# Patient Record
Sex: FEMALE | Race: WHITE | Employment: OTHER | URBAN - METROPOLITAN AREA
[De-identification: names, ages, dates, MRNs, and addresses within clinical notes are randomized per-mention and may not be internally consistent; named-entity substitution may affect disease eponyms.]

---

## 2018-10-19 ENCOUNTER — TELEPHONE (OUTPATIENT)
Dept: OBGYN CLINIC | Facility: HOSPITAL | Age: 58
End: 2018-10-19

## 2018-10-19 NOTE — TELEPHONE ENCOUNTER
Patient is calling and is wanting to become a new patient  She has regional pain syndrome and has been seeing a doctor who was controlling her medication  Her doctor then left the practice and she hasn't been seen since  She will have the records transferred over  She was supplied the [facility] fax # [phone]

## 2018-11-13 NOTE — TELEPHONE ENCOUNTER
Records received in [facility] office  Left message for patient to call back  We need to verify what insurance patient has  Also has patient had any imaging studies done? Please obtain this information from patient  Thank you!

## 2018-11-13 NOTE — TELEPHONE ENCOUNTER
Patient is calling back stating that she has Medicare  Also, she is pending to start a primary of [employer]  This would start in January  She states that she had a CT done of her abdomen and pelvis

## 2018-11-15 NOTE — TELEPHONE ENCOUNTER
Left message for patient regarding records  In order to move forward Dr Soriano July has requested records from [place]  to be faxed for further review  I also asked Christian Stern if she could fax us a [place] PDMP for review as well if she received pain medications from that dr as well

## 2018-12-28 NOTE — TELEPHONE ENCOUNTER
Pt's  calling for an update  Asking if the records were received yet from Dr Swetha Jennings office in [place], [place]  If not, he will have to get in contact with them again  Please call Slim Rene at [phone]

## 2018-12-28 NOTE — TELEPHONE ENCOUNTER
Called Gary back and informed him as below in previous message that we have not received records from [place]  They will need to request them to be faxed to us   Provided  as call back if further questions

## 2019-01-03 NOTE — TELEPHONE ENCOUNTER
Rest of records were received, back for Dr Galindo Conway to review, lmom for patient stating this as well

## 2019-01-16 NOTE — TELEPHONE ENCOUNTER
Pt  AVIVA  called to check the status of Dr Sunday Arellano evaluation of pt file  Please advise   Pt can be reached at [phone]

## 2019-01-18 NOTE — TELEPHONE ENCOUNTER
Records is still pending for review, on provider desk  Will follow up with patient and  to confirm this information

## 2019-01-24 NOTE — TELEPHONE ENCOUNTER
Patient`s  called checking on the status of record review please advise thanks                              Call back# [phone]

## 2019-01-29 NOTE — TELEPHONE ENCOUNTER
Pt called back to get the status of the records review  Pt is scheduled for 2/28  Pt is aware of the terms Lisset Rouse has came up with regarding her care and she states " she will try anything for a doctor who is willing to go through her [place]" pt expressed gratitude  I advised pt that the first appt is a consultation and Dr Lisset Rouse will speak further on the Interventional options

## 2019-01-29 NOTE — TELEPHONE ENCOUNTER
lmom for patient to call back  Per Dr Armand Richard:     1- does not rx opioid for her conditionn  2- if patient is interested in INTERVENTIONAL options she can schedule a consult

## 2019-02-28 ENCOUNTER — OFFICE VISIT (OUTPATIENT)
Dept: PAIN MEDICINE | Facility: MEDICAL CENTER | Age: 59
End: 2019-02-28
Payer: COMMERCIAL

## 2019-02-28 VITALS
WEIGHT: 228 LBS | DIASTOLIC BLOOD PRESSURE: 106 MMHG | HEART RATE: 60 BPM | SYSTOLIC BLOOD PRESSURE: 186 MMHG | BODY MASS INDEX: 36.64 KG/M2 | HEIGHT: 66 IN

## 2019-02-28 DIAGNOSIS — G89.4 CHRONIC PAIN SYNDROME: Primary | ICD-10-CM

## 2019-02-28 DIAGNOSIS — G90.50 COMPLEX REGIONAL PAIN SYNDROME TYPE 1, AFFECTING UNSPECIFIED SITE: ICD-10-CM

## 2019-02-28 DIAGNOSIS — Z79.891 LONG-TERM CURRENT USE OF OPIATE ANALGESIC: ICD-10-CM

## 2019-02-28 DIAGNOSIS — F12.90 MARIJUANA USE: ICD-10-CM

## 2019-02-28 PROCEDURE — 99204 OFFICE O/P NEW MOD 45 MIN: CPT | Performed by: PHYSICAL MEDICINE & REHABILITATION

## 2019-02-28 RX ORDER — ASPIRIN 81 MG/1
81 TABLET, CHEWABLE ORAL
COMMUNITY
Start: 2017-04-11

## 2019-02-28 RX ORDER — MONTELUKAST SODIUM 10 MG/1
10 TABLET ORAL DAILY
Refills: 0 | COMMUNITY
Start: 2018-12-10

## 2019-02-28 RX ORDER — MORPHINE SULFATE 30 MG/1
30 TABLET ORAL EVERY 4 HOURS PRN
COMMUNITY

## 2019-02-28 RX ORDER — FUROSEMIDE 20 MG/1
20 TABLET ORAL
COMMUNITY
End: 2020-11-11 | Stop reason: ALTCHOICE

## 2019-02-28 RX ORDER — ATORVASTATIN CALCIUM 10 MG/1
10 TABLET, FILM COATED ORAL DAILY
Refills: 0 | COMMUNITY
Start: 2019-01-23

## 2019-02-28 RX ORDER — TIZANIDINE 4 MG/1
TABLET ORAL
COMMUNITY
Start: 2019-02-22

## 2019-02-28 RX ORDER — DOXYCYCLINE HYCLATE 100 MG/1
1 CAPSULE ORAL
COMMUNITY
Start: 2018-03-27 | End: 2020-05-11 | Stop reason: SDUPTHER

## 2019-02-28 RX ORDER — CETIRIZINE HYDROCHLORIDE 10 MG/1
10 TABLET ORAL
COMMUNITY
Start: 2018-02-23

## 2019-02-28 RX ORDER — BUTALBITAL, ACETAMINOPHEN AND CAFFEINE 50; 325; 40 MG/1; MG/1; MG/1
1 TABLET ORAL EVERY 6 HOURS PRN
Refills: 0 | COMMUNITY
Start: 2019-01-07 | End: 2020-11-11 | Stop reason: ALTCHOICE

## 2019-02-28 RX ORDER — ALBUTEROL SULFATE 90 UG/1
2 AEROSOL, METERED RESPIRATORY (INHALATION) 2 TIMES DAILY PRN
COMMUNITY

## 2019-02-28 RX ORDER — PROMETHAZINE HYDROCHLORIDE 25 MG/1
25 TABLET ORAL EVERY 8 HOURS
Refills: 0 | COMMUNITY
Start: 2019-01-24 | End: 2020-11-11 | Stop reason: ALTCHOICE

## 2019-02-28 RX ORDER — HYDROCHLOROTHIAZIDE 12.5 MG/1
12.5 CAPSULE, GELATIN COATED ORAL
COMMUNITY
End: 2020-11-11 | Stop reason: ALTCHOICE

## 2019-02-28 RX ORDER — AMLODIPINE BESYLATE 10 MG/1
1 TABLET ORAL
COMMUNITY
Start: 2018-03-28 | End: 2020-11-11 | Stop reason: DRUGHIGH

## 2019-02-28 RX ORDER — DULOXETIN HYDROCHLORIDE 30 MG/1
30 CAPSULE, DELAYED RELEASE ORAL DAILY
Refills: 0 | COMMUNITY
Start: 2019-01-26 | End: 2020-05-11 | Stop reason: ALTCHOICE

## 2019-02-28 RX ORDER — ALPRAZOLAM 0.25 MG/1
1 TABLET ORAL 3 TIMES DAILY PRN
COMMUNITY
End: 2020-11-11 | Stop reason: ALTCHOICE

## 2019-02-28 RX ORDER — RANITIDINE 150 MG/1
150 CAPSULE ORAL
COMMUNITY
End: 2020-11-11 | Stop reason: ALTCHOICE

## 2019-02-28 NOTE — PROGRESS NOTES
Assessment:  1  Chronic pain syndrome    2  Complex regional pain syndrome type 1, affecting unspecified site    3  Long-term current use of opiate analgesic    4  Marijuana use        Plan:  I reviewed with the patient that unfortunately the complexity of her condition is beyond what we are able to assist with in our clinic  We do not have the ability to utilize ketamine infusions and she likely would not respond to interventional approaches such as spinal cord stimulators  She is currently managed on high dose opioid therapy with medical marijuana as well as Zanaflex and alprazolam   This is a dangerous combination of medications placing her at significant risk for respiratory depression and death  She describes whole body CRPS and I recommend she follow up with 1 of the tertiary care centers to establish a long-term treatment strategy with avoidance of chronic opioid therapy as this typically worsens the hyperalgesic state of CRPS  My impressions and treatment recommendations were discussed in detail with the patient who verbalized understanding and had no further questions  Discharge instructions were provided  I personally saw and examined the patient and I agree with the above discussed plan of care  No orders of the defined types were placed in this encounter      New Medications Ordered This Visit   Medications    albuterol (PROVENTIL HFA) 90 mcg/act inhaler     Sig: Inhale 2 puffs 2 (two) times a day as needed    ALPRAZolam (XANAX) 0 25 mg tablet     Sig: Take 1 mg by mouth Three times daily as needed    amLODIPine (NORVASC) 10 mg tablet     Sig: Take 1 tablet by mouth    aspirin 81 mg chewable tablet     Sig: Chew 81 mg    atorvastatin (LIPITOR) 10 mg tablet     Sig: Take 10 mg by mouth daily     Refill:  0    butalbital-acetaminophen-caffeine (FIORICET,ESGIC) -40 mg per tablet     Sig: Take 1 tablet by mouth every 6 (six) hours as needed     Refill:  0    cetirizine (ZYRTEC ALLERGY) 10 mg tablet     Sig: Take 10 mg by mouth    doxycycline hyclate (VIBRAMYCIN) 100 mg capsule     Sig: Take 1 capsule by mouth    DULoxetine (CYMBALTA) 30 mg delayed release capsule     Sig: Take 30 mg by mouth daily     Refill:  0    furosemide (LASIX) 20 mg tablet     Sig: Take 20 mg by mouth    hydrochlorothiazide (MICROZIDE) 12 5 mg capsule     Sig: Take 12 5 mg by mouth    montelukast (SINGULAIR) 10 mg tablet     Sig: Take 10 mg by mouth daily     Refill:  0    morphine (MSIR) 30 MG tablet     Sig: Take 30 mg by mouth every 4 (four) hours as needed    promethazine (PHENERGAN) 25 mg tablet     Sig: Take 25 mg by mouth every 8 (eight) hours     Refill:  0    ranitidine (ZANTAC) 150 MG capsule     Sig: Take 150 mg by mouth    tiZANidine (ZANAFLEX) 4 mg tablet       History of Present Illness:    Juan Henderson is a 59 y o  female sent for consultation from Dr Monique Feldman for evaluation of her whole body CRPS complaints  She has been experiencing 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  She describes pain as well as neurologic symptoms such as tremors and episodic falls  She has pain throughout her spine and CRPS throughout her entire body  She states the injury occurred July 1, 1996  She describes pain that is moderate to severe intensity rated as a 9 to 10/10  This is constant throughout the entirety of the day without any typical pattern  She characterizes the pain as burning cramping, shooting, numbness, stabbing, sharp, pins and needles, stiffness, throbbing, myoclonic jerks, and seizures  She describes upper and lower extremity weakness as well as dropping of objects  She has she did in essentially the entire pain diagram     Aggravating factors include standing, bending, walking, exercise, coughing, sneezing, and bowel movements  Alleviating factors include sitting  She has seen multiple specialists related to this diagnosis    She has gone through ketamine infusions as well as trials of numerous medications  Currently she is maintained on high dose opioid therapy along with medical marijuana  She did initially have some excellent relief with her cervical spine surgery  Moderate relief with injections and heat or ice application  No relief with exercise, 10s unit, hypnosis, or chiropractic manipulation  Her medical history is significant for anxiety, depression, psychiatric problems  She did have an inpatient psych admission to a [facility] [address] in [place] for 5 days in January of 2017  I have personally reviewed and/or updated the patient's past medical history, past surgical history, family history, social history, current medications, allergies, and vital signs today  Review of Systems:    Review of Systems    There is no problem list on file for this patient        Past Medical History:   Diagnosis Date    Depression     Hepatitis C     Skin disease        Past Surgical History:   Procedure Laterality Date    BILATERAL TEMPOROMANDIBULAR JOINT ARTHROPLASTY  1987    DENTAL SURGERY  2004    NECK SURGERY  1997       Family History   Problem Relation Age of Onset    No Known Problems Mother     No Known Problems Father     Cancer Daughter        Social History     Occupational History    Not on file   Tobacco Use    Smoking status: Former Smoker    Smokeless tobacco: Never Used   Substance and Sexual Activity    Alcohol use: Not on file    Drug use: Not on file    Sexual activity: Not on file       Current Outpatient Medications on File Prior to Visit   Medication Sig    amLODIPine (NORVASC) 10 mg tablet Take 1 tablet by mouth    aspirin 81 mg chewable tablet Chew 81 mg    cetirizine (ZYRTEC ALLERGY) 10 mg tablet Take 10 mg by mouth    doxycycline hyclate (VIBRAMYCIN) 100 mg capsule Take 1 capsule by mouth    albuterol (PROVENTIL HFA) 90 mcg/act inhaler Inhale 2 puffs 2 (two) times a day as needed    ALPRAZolam (XANAX) 0 25 mg tablet Take 1 mg by mouth Three times daily as needed    atorvastatin (LIPITOR) 10 mg tablet Take 10 mg by mouth daily    butalbital-acetaminophen-caffeine (FIORICET,ESGIC) -40 mg per tablet Take 1 tablet by mouth every 6 (six) hours as needed    DULoxetine (CYMBALTA) 30 mg delayed release capsule Take 30 mg by mouth daily    furosemide (LASIX) 20 mg tablet Take 20 mg by mouth    hydrochlorothiazide (MICROZIDE) 12 5 mg capsule Take 12 5 mg by mouth    montelukast (SINGULAIR) 10 mg tablet Take 10 mg by mouth daily    morphine (MSIR) 30 MG tablet Take 30 mg by mouth every 4 (four) hours as needed    promethazine (PHENERGAN) 25 mg tablet Take 25 mg by mouth every 8 (eight) hours    ranitidine (ZANTAC) 150 MG capsule Take 150 mg by mouth    tiZANidine (ZANAFLEX) 4 mg tablet      No current facility-administered medications on file prior to visit  Allergies   Allergen Reactions    Erythromycin Hives    Lidocaine Hives    Penicillins Anaphylaxis and Other (See Comments)     Anaphylaxis:trever ancef      Strawberry Extract Hives    Vancomycin Hives and Rash    Amantadine Other (See Comments)     severe vomiting    Clindamycin Other (See Comments)    Clonazepam Other (See Comments)    Duloxetine     Gabapentin Other (See Comments)     excruating pain    Quetiapine     Topiramate     Clonidine Other (See Comments) and Rash     chest burning         Physical Exam:    BP (!) 186/106   Pulse 60   Ht 5' 5 5" (1 664 m)   Wt 103 kg (228 lb)   BMI 37 36 kg/m²     General: Well-developed, well-nourished individual in no acute distress  Mental: Appropriate mood and affect  Grossly oriented with coherent speech and thought processing   The patient does get emotional throughout the visit and holds back tears at times  Neuro:  Cranial nerves: Cranial nerve function is grossly intact bilaterally   Strength: Bilateral upper extremity strength is normal and symmetric   No atrophy or tone abnormalities noted   Reflexes: Bilateral upper and lower extremity muscle stretch reflexes are physiologic and symmetric   No Meneses sign   Sensation: No loss of sensation is noted although she does notice some decreased sensation to pinprick over the left hand compared to the right  Gait:  Gait/gross motor: Gait is normal  Station is normal      Musculoskeletal:  Spine: Normal pain-free range of motion of the cervical spine  No gross axial skeletal deformities   Lymph: No lymphadenopathy is appreciated in the involved extremity   Vessels: No lower extremity edema   Lungs: Breathing is comfortable and regular  No dyspnea noted during examination   Eyes: Visual field grossly intact to confrontation  No redness appreciated  ENT: No craniofacial deformities or asymmetry  No neck masses appreciated              Imaging

## 2019-02-28 NOTE — LETTER
February 28, 2019     Homer Lowe DO  [address]    Patient: Patric Machuca   YOB: 1960   Date of Visit: 2/28/2019       Dear Dr Mylene Maloney: Thank you for referring Patric Machuca to me for evaluation  Below are my notes for this consultation  If you have questions, please do not hesitate to call me  I look forward to following your patient along with you  Sincerely,        Matilda Esparza DO        CC: No Recipients  Matilda Esparza DO  2/28/2019 12:16 PM  Sign at close encounter  Assessment:  1  Chronic pain syndrome    2  Complex regional pain syndrome type 1, affecting unspecified site    3  Long-term current use of opiate analgesic    4  Marijuana use        Plan:  I reviewed with the patient that unfortunately the complexity of her condition is beyond what we are able to assist with in our clinic  We do not have the ability to utilize ketamine infusions and she likely would not respond to interventional approaches such as spinal cord stimulators  She is currently managed on high dose opioid therapy with medical marijuana as well as Zanaflex and alprazolam   This is a dangerous combination of medications placing her at significant risk for respiratory depression and death  She describes whole body CRPS and I recommend she follow up with 1 of the tertiary care centers to establish a long-term treatment strategy with avoidance of chronic opioid therapy as this typically worsens the hyperalgesic state of CRPS  My impressions and treatment recommendations were discussed in detail with the patient who verbalized understanding and had no further questions  Discharge instructions were provided  I personally saw and examined the patient and I agree with the above discussed plan of care  No orders of the defined types were placed in this encounter      New Medications Ordered This Visit   Medications    albuterol (PROVENTIL HFA) 90 mcg/act inhaler     Sig: Inhale 2 puffs 2 (two) times a day as needed    ALPRAZolam (XANAX) 0 25 mg tablet     Sig: Take 1 mg by mouth Three times daily as needed    amLODIPine (NORVASC) 10 mg tablet     Sig: Take 1 tablet by mouth    aspirin 81 mg chewable tablet     Sig: Chew 81 mg    atorvastatin (LIPITOR) 10 mg tablet     Sig: Take 10 mg by mouth daily     Refill:  0    butalbital-acetaminophen-caffeine (FIORICET,ESGIC) -40 mg per tablet     Sig: Take 1 tablet by mouth every 6 (six) hours as needed     Refill:  0    cetirizine (ZYRTEC ALLERGY) 10 mg tablet     Sig: Take 10 mg by mouth    doxycycline hyclate (VIBRAMYCIN) 100 mg capsule     Sig: Take 1 capsule by mouth    DULoxetine (CYMBALTA) 30 mg delayed release capsule     Sig: Take 30 mg by mouth daily     Refill:  0    furosemide (LASIX) 20 mg tablet     Sig: Take 20 mg by mouth    hydrochlorothiazide (MICROZIDE) 12 5 mg capsule     Sig: Take 12 5 mg by mouth    montelukast (SINGULAIR) 10 mg tablet     Sig: Take 10 mg by mouth daily     Refill:  0    morphine (MSIR) 30 MG tablet     Sig: Take 30 mg by mouth every 4 (four) hours as needed    promethazine (PHENERGAN) 25 mg tablet     Sig: Take 25 mg by mouth every 8 (eight) hours     Refill:  0    ranitidine (ZANTAC) 150 MG capsule     Sig: Take 150 mg by mouth    tiZANidine (ZANAFLEX) 4 mg tablet       History of Present Illness:    Enedelia Zendejas is a 59 y o  female sent for consultation from Dr Quan Arguelles for evaluation of her whole body CRPS complaints  She has been experiencing 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  She describes pain as well as neurologic symptoms such as tremors and episodic falls  She has pain throughout her spine and CRPS throughout her entire body  She states the injury occurred July 1, 1996  She describes pain that is moderate to severe intensity rated as a 9 to 10/10    This is constant throughout the entirety of the day without any typical pattern  She characterizes the pain as burning cramping, shooting, numbness, stabbing, sharp, pins and needles, stiffness, throbbing, myoclonic jerks, and seizures  She describes upper and lower extremity weakness as well as dropping of objects  She has she did in essentially the entire pain diagram     Aggravating factors include standing, bending, walking, exercise, coughing, sneezing, and bowel movements  Alleviating factors include sitting  She has seen multiple specialists related to this diagnosis  She has gone through ketamine infusions as well as trials of numerous medications  Currently she is maintained on high dose opioid therapy along with medical marijuana  She did initially have some excellent relief with her cervical spine surgery  Moderate relief with injections and heat or ice application  No relief with exercise, 10s unit, hypnosis, or chiropractic manipulation  Her medical history is significant for anxiety, depression, psychiatric problems  She did have an inpatient psych admission to a [facility] [address] in [place] for 5 days in January of 2017  I have personally reviewed and/or updated the patient's past medical history, past surgical history, family history, social history, current medications, allergies, and vital signs today  Review of Systems:    Review of Systems    There is no problem list on file for this patient        Past Medical History:   Diagnosis Date    Depression     Hepatitis C     Skin disease        Past Surgical History:   Procedure Laterality Date    BILATERAL TEMPOROMANDIBULAR JOINT ARTHROPLASTY  1987    DENTAL SURGERY  2004    NECK SURGERY  1997       Family History   Problem Relation Age of Onset    No Known Problems Mother     No Known Problems Father     Cancer Daughter        Social History     Occupational History    Not on file   Tobacco Use    Smoking status: Former Smoker    Smokeless tobacco: Never Used   Substance and Sexual Activity    Alcohol use: Not on file    Drug use: Not on file    Sexual activity: Not on file       Current Outpatient Medications on File Prior to Visit   Medication Sig    amLODIPine (NORVASC) 10 mg tablet Take 1 tablet by mouth    aspirin 81 mg chewable tablet Chew 81 mg    cetirizine (ZYRTEC ALLERGY) 10 mg tablet Take 10 mg by mouth    doxycycline hyclate (VIBRAMYCIN) 100 mg capsule Take 1 capsule by mouth    albuterol (PROVENTIL HFA) 90 mcg/act inhaler Inhale 2 puffs 2 (two) times a day as needed    ALPRAZolam (XANAX) 0 25 mg tablet Take 1 mg by mouth Three times daily as needed    atorvastatin (LIPITOR) 10 mg tablet Take 10 mg by mouth daily    butalbital-acetaminophen-caffeine (FIORICET,ESGIC) -40 mg per tablet Take 1 tablet by mouth every 6 (six) hours as needed    DULoxetine (CYMBALTA) 30 mg delayed release capsule Take 30 mg by mouth daily    furosemide (LASIX) 20 mg tablet Take 20 mg by mouth    hydrochlorothiazide (MICROZIDE) 12 5 mg capsule Take 12 5 mg by mouth    montelukast (SINGULAIR) 10 mg tablet Take 10 mg by mouth daily    morphine (MSIR) 30 MG tablet Take 30 mg by mouth every 4 (four) hours as needed    promethazine (PHENERGAN) 25 mg tablet Take 25 mg by mouth every 8 (eight) hours    ranitidine (ZANTAC) 150 MG capsule Take 150 mg by mouth    tiZANidine (ZANAFLEX) 4 mg tablet      No current facility-administered medications on file prior to visit          Allergies   Allergen Reactions    Erythromycin Hives    Lidocaine Hives    Penicillins Anaphylaxis and Other (See Comments)     Anaphylaxis:trever ancef      Strawberry Extract Hives    Vancomycin Hives and Rash    Amantadine Other (See Comments)     severe vomiting    Clindamycin Other (See Comments)    Clonazepam Other (See Comments)    Duloxetine     Gabapentin Other (See Comments)     excruating pain    Quetiapine     Topiramate     Clonidine Other (See Comments) and Rash chest burning         Physical Exam:    BP (!) 186/106   Pulse 60   Ht 5' 5 5" (1 664 m)   Wt 103 kg (228 lb)   BMI 37 36 kg/m²      General: Well-developed, well-nourished individual in no acute distress  Mental: Appropriate mood and affect  Grossly oriented with coherent speech and thought processing   The patient does get emotional throughout the visit and holds back tears at times  Neuro:  Cranial nerves: Cranial nerve function is grossly intact bilaterally   Strength: Bilateral upper extremity strength is normal and symmetric   No atrophy or tone abnormalities noted   Reflexes: Bilateral upper and lower extremity muscle stretch reflexes are physiologic and symmetric   No Meneses sign   Sensation: No loss of sensation is noted although she does notice some decreased sensation to pinprick over the left hand compared to the right  Gait:  Gait/gross motor: Gait is normal  Station is normal      Musculoskeletal:  Spine: Normal pain-free range of motion of the cervical spine  No gross axial skeletal deformities   Lymph: No lymphadenopathy is appreciated in the involved extremity   Vessels: No lower extremity edema   Lungs: Breathing is comfortable and regular  No dyspnea noted during examination   Eyes: Visual field grossly intact to confrontation  No redness appreciated  ENT: No craniofacial deformities or asymmetry  No neck masses appreciated              Imaging

## 2019-02-28 NOTE — LETTER
February 28, 2019     Taylor Ratliff DO  [address]    Patient: Barbie Salcido   YOB: 1960   Date of Visit: 2/28/2019       Dear Dr Yao Phledudley: Thank you for referring Barbie Salcido to me for evaluation  Below are my notes for this consultation  If you have questions, please do not hesitate to call me  I look forward to following your patient along with you  Sincerely,        Mallory Cunningham DO        CC: No Recipients  Mallory Cunningham DO  2/28/2019 12:11 PM  Sign at close encounter  Assessment:  1  Chronic pain syndrome    2  Complex regional pain syndrome type 1, affecting unspecified site    3  Long-term current use of opiate analgesic    4  Marijuana use        Plan:  I reviewed with the patient that unfortunately the complexity of her condition is beyond what we are able to assist with in our clinic  We do not have the ability to utilize ketamine infusions and she likely would not respond to interventional approaches such as spinal cord stimulators  She is currently managed on high dose opioid therapy with medical marijuana  She describes whole body CRPS and I recommend she follow up with 1 of the tertiary care centers to establish a long-term treatment strategy with avoidance of chronic opioid therapy as this typically worsens the hyperalgesic state of CRPS  My impressions and treatment recommendations were discussed in detail with the patient who verbalized understanding and had no further questions  Discharge instructions were provided  I personally saw and examined the patient and I agree with the above discussed plan of care  No orders of the defined types were placed in this encounter      New Medications Ordered This Visit   Medications    albuterol (PROVENTIL HFA) 90 mcg/act inhaler     Sig: Inhale 2 puffs 2 (two) times a day as needed    ALPRAZolam (XANAX) 0 25 mg tablet     Sig: Take 1 mg by mouth Three times daily as needed  amLODIPine (NORVASC) 10 mg tablet     Sig: Take 1 tablet by mouth    aspirin 81 mg chewable tablet     Sig: Chew 81 mg    atorvastatin (LIPITOR) 10 mg tablet     Sig: Take 10 mg by mouth daily     Refill:  0    butalbital-acetaminophen-caffeine (FIORICET,ESGIC) -40 mg per tablet     Sig: Take 1 tablet by mouth every 6 (six) hours as needed     Refill:  0    cetirizine (ZYRTEC ALLERGY) 10 mg tablet     Sig: Take 10 mg by mouth    doxycycline hyclate (VIBRAMYCIN) 100 mg capsule     Sig: Take 1 capsule by mouth    DULoxetine (CYMBALTA) 30 mg delayed release capsule     Sig: Take 30 mg by mouth daily     Refill:  0    furosemide (LASIX) 20 mg tablet     Sig: Take 20 mg by mouth    hydrochlorothiazide (MICROZIDE) 12 5 mg capsule     Sig: Take 12 5 mg by mouth    montelukast (SINGULAIR) 10 mg tablet     Sig: Take 10 mg by mouth daily     Refill:  0    morphine (MSIR) 30 MG tablet     Sig: Take 30 mg by mouth every 4 (four) hours as needed    promethazine (PHENERGAN) 25 mg tablet     Sig: Take 25 mg by mouth every 8 (eight) hours     Refill:  0    ranitidine (ZANTAC) 150 MG capsule     Sig: Take 150 mg by mouth    tiZANidine (ZANAFLEX) 4 mg tablet       History of Present Illness:    Enedelia Zendejas is a 59 y o  female ***    I have personally reviewed and/or updated the patient's past medical history, past surgical history, family history, social history, current medications, allergies, and vital signs today  Review of Systems:    Review of Systems    There is no problem list on file for this patient        Past Medical History:   Diagnosis Date    Depression     Hepatitis C     Skin disease        Past Surgical History:   Procedure Laterality Date    BILATERAL TEMPOROMANDIBULAR JOINT ARTHROPLASTY  1987    DENTAL SURGERY  2004    NECK SURGERY  1997       Family History   Problem Relation Age of Onset    No Known Problems Mother     No Known Problems Father     Cancer Daughter Social History     Occupational History    Not on file   Tobacco Use    Smoking status: Former Smoker    Smokeless tobacco: Never Used   Substance and Sexual Activity    Alcohol use: Not on file    Drug use: Not on file    Sexual activity: Not on file       Current Outpatient Medications on File Prior to Visit   Medication Sig    amLODIPine (NORVASC) 10 mg tablet Take 1 tablet by mouth    aspirin 81 mg chewable tablet Chew 81 mg    cetirizine (ZYRTEC ALLERGY) 10 mg tablet Take 10 mg by mouth    doxycycline hyclate (VIBRAMYCIN) 100 mg capsule Take 1 capsule by mouth    albuterol (PROVENTIL HFA) 90 mcg/act inhaler Inhale 2 puffs 2 (two) times a day as needed    ALPRAZolam (XANAX) 0 25 mg tablet Take 1 mg by mouth Three times daily as needed    atorvastatin (LIPITOR) 10 mg tablet Take 10 mg by mouth daily    butalbital-acetaminophen-caffeine (FIORICET,ESGIC) -40 mg per tablet Take 1 tablet by mouth every 6 (six) hours as needed    DULoxetine (CYMBALTA) 30 mg delayed release capsule Take 30 mg by mouth daily    furosemide (LASIX) 20 mg tablet Take 20 mg by mouth    hydrochlorothiazide (MICROZIDE) 12 5 mg capsule Take 12 5 mg by mouth    montelukast (SINGULAIR) 10 mg tablet Take 10 mg by mouth daily    morphine (MSIR) 30 MG tablet Take 30 mg by mouth every 4 (four) hours as needed    promethazine (PHENERGAN) 25 mg tablet Take 25 mg by mouth every 8 (eight) hours    ranitidine (ZANTAC) 150 MG capsule Take 150 mg by mouth    tiZANidine (ZANAFLEX) 4 mg tablet      No current facility-administered medications on file prior to visit          Allergies   Allergen Reactions    Erythromycin Hives    Lidocaine Hives    Penicillins Anaphylaxis and Other (See Comments)     Anaphylaxis:trever ancef      Strawberry Extract Hives    Vancomycin Hives and Rash    Amantadine Other (See Comments)     severe vomiting    Clindamycin Other (See Comments)    Clonazepam Other (See Comments)    Duloxetine  Gabapentin Other (See Comments)     excruating pain    Quetiapine     Topiramate     Clonidine Other (See Comments) and Rash     chest burning         Physical Exam:    BP (!) 186/106   Pulse 60   Ht 5' 5 5" (1 664 m)   Wt 103 kg (228 lb)   BMI 37 36 kg/m²      General: Well-developed, well-nourished individual in no acute distress  Mental: Appropriate mood and affect  Grossly oriented with coherent speech and thought processing   The patient does get emotional throughout the visit and holds back tears at times  Neuro:  Cranial nerves: Cranial nerve function is grossly intact bilaterally   Strength: Bilateral upper extremity strength is normal and symmetric   No atrophy or tone abnormalities noted   Reflexes: Bilateral upper and lower extremity muscle stretch reflexes are physiologic and symmetric   No Meneses sign   Sensation: No loss of sensation is noted although she does notice some decreased sensation to pinprick over the left hand compared to the right  Gait:  Gait/gross motor: Gait is normal  Station is normal      Musculoskeletal:  Spine: Normal pain-free range of motion of the cervical spine  No gross axial skeletal deformities   Lymph: No lymphadenopathy is appreciated in the involved extremity   Vessels: No lower extremity edema   Lungs: Breathing is comfortable and regular  No dyspnea noted during examination   Eyes: Visual field grossly intact to confrontation  No redness appreciated  ENT: No craniofacial deformities or asymmetry  No neck masses appreciated              Imaging

## 2019-04-02 ENCOUNTER — TELEPHONE (OUTPATIENT)
Dept: PAIN MEDICINE | Facility: MEDICAL CENTER | Age: 59
End: 2019-04-02

## 2020-03-30 ENCOUNTER — TELEPHONE (OUTPATIENT)
Dept: NEUROLOGY | Facility: CLINIC | Age: 60
End: 2020-03-30

## 2020-04-23 ENCOUNTER — TELEPHONE (OUTPATIENT)
Dept: OBGYN CLINIC | Facility: MEDICAL CENTER | Age: 60
End: 2020-04-23

## 2020-05-11 ENCOUNTER — APPOINTMENT (OUTPATIENT)
Dept: LAB | Facility: MEDICAL CENTER | Age: 60
End: 2020-05-11
Payer: COMMERCIAL

## 2020-05-11 ENCOUNTER — OFFICE VISIT (OUTPATIENT)
Dept: RHEUMATOLOGY | Facility: CLINIC | Age: 60
End: 2020-05-11
Payer: COMMERCIAL

## 2020-05-11 VITALS
HEIGHT: 65 IN | SYSTOLIC BLOOD PRESSURE: 197 MMHG | HEART RATE: 106 BPM | BODY MASS INDEX: 33.15 KG/M2 | DIASTOLIC BLOOD PRESSURE: 106 MMHG | WEIGHT: 199 LBS

## 2020-05-11 DIAGNOSIS — G90.50 COMPLEX REGIONAL PAIN SYNDROME TYPE 1, AFFECTING UNSPECIFIED SITE: ICD-10-CM

## 2020-05-11 DIAGNOSIS — M19.90 OSTEOARTHRITIS, UNSPECIFIED OSTEOARTHRITIS TYPE, UNSPECIFIED SITE: ICD-10-CM

## 2020-05-11 DIAGNOSIS — M25.50 POLYARTHRALGIA: Primary | ICD-10-CM

## 2020-05-11 DIAGNOSIS — L98.8 MORGELLONS SYNDROME: ICD-10-CM

## 2020-05-11 LAB
CRP SERPL QL: <3 MG/L
ERYTHROCYTE [SEDIMENTATION RATE] IN BLOOD: 51 MM/HOUR (ref 0–20)

## 2020-05-11 PROCEDURE — 86200 CCP ANTIBODY: CPT | Performed by: INTERNAL MEDICINE

## 2020-05-11 PROCEDURE — 86140 C-REACTIVE PROTEIN: CPT | Performed by: INTERNAL MEDICINE

## 2020-05-11 PROCEDURE — 86430 RHEUMATOID FACTOR TEST QUAL: CPT | Performed by: INTERNAL MEDICINE

## 2020-05-11 PROCEDURE — 99204 OFFICE O/P NEW MOD 45 MIN: CPT | Performed by: INTERNAL MEDICINE

## 2020-05-11 PROCEDURE — 85652 RBC SED RATE AUTOMATED: CPT | Performed by: INTERNAL MEDICINE

## 2020-05-11 PROCEDURE — 86038 ANTINUCLEAR ANTIBODIES: CPT | Performed by: INTERNAL MEDICINE

## 2020-05-11 PROCEDURE — 36415 COLL VENOUS BLD VENIPUNCTURE: CPT | Performed by: INTERNAL MEDICINE

## 2020-05-11 RX ORDER — AMITRIPTYLINE HYDROCHLORIDE 25 MG/1
25 TABLET, FILM COATED ORAL
Qty: 30 TABLET | Refills: 3 | Status: SHIPPED | OUTPATIENT
Start: 2020-05-11 | End: 2020-08-19

## 2020-05-11 RX ORDER — DOXYCYCLINE HYCLATE 100 MG/1
100 CAPSULE ORAL EVERY 12 HOURS SCHEDULED
Qty: 60 CAPSULE | Refills: 3 | Status: SHIPPED | OUTPATIENT
Start: 2020-05-11 | End: 2020-09-08

## 2020-05-12 LAB — RHEUMATOID FACT SER QL LA: NEGATIVE

## 2020-05-13 LAB
CCP IGA+IGG SERPL IA-ACNC: 21 UNITS (ref 0–19)
RYE IGE QN: NEGATIVE

## 2020-05-18 ENCOUNTER — TELEPHONE (OUTPATIENT)
Dept: DERMATOLOGY | Facility: CLINIC | Age: 60
End: 2020-05-18

## 2020-08-19 DIAGNOSIS — G90.50 COMPLEX REGIONAL PAIN SYNDROME TYPE 1, AFFECTING UNSPECIFIED SITE: ICD-10-CM

## 2020-08-19 RX ORDER — AMITRIPTYLINE HYDROCHLORIDE 25 MG/1
TABLET, FILM COATED ORAL
Qty: 30 TABLET | Refills: 3 | Status: SHIPPED | OUTPATIENT
Start: 2020-08-19 | End: 2020-11-11 | Stop reason: ALTCHOICE

## 2020-10-14 ENCOUNTER — TELEPHONE (OUTPATIENT)
Dept: NEUROLOGY | Facility: CLINIC | Age: 60
End: 2020-10-14

## 2020-11-09 ENCOUNTER — TELEPHONE (OUTPATIENT)
Dept: NEUROLOGY | Facility: CLINIC | Age: 60
End: 2020-11-09

## 2020-11-10 ENCOUNTER — TELEPHONE (OUTPATIENT)
Dept: NEUROLOGY | Facility: CLINIC | Age: 60
End: 2020-11-10

## 2020-11-11 ENCOUNTER — CONSULT (OUTPATIENT)
Dept: NEUROLOGY | Facility: CLINIC | Age: 60
End: 2020-11-11
Payer: COMMERCIAL

## 2020-11-11 VITALS
HEART RATE: 94 BPM | DIASTOLIC BLOOD PRESSURE: 80 MMHG | TEMPERATURE: 96.8 F | WEIGHT: 182.6 LBS | SYSTOLIC BLOOD PRESSURE: 138 MMHG | HEIGHT: 65 IN | BODY MASS INDEX: 30.42 KG/M2

## 2020-11-11 DIAGNOSIS — R55 SYNCOPE, UNSPECIFIED SYNCOPE TYPE: ICD-10-CM

## 2020-11-11 DIAGNOSIS — G40.909 SEIZURE DISORDER (HCC): Primary | ICD-10-CM

## 2020-11-11 DIAGNOSIS — G62.9 POLYNEUROPATHY: ICD-10-CM

## 2020-11-11 PROCEDURE — 99205 OFFICE O/P NEW HI 60 MIN: CPT | Performed by: PSYCHIATRY & NEUROLOGY

## 2020-11-11 RX ORDER — ANTI-FUNGAL POWDER MICONAZOLE NITRATE TALC FREE 1.42 G/71G
POWDER TOPICAL AS NEEDED
COMMUNITY

## 2020-11-11 RX ORDER — BUPRENORPHINE HYDROCHLORIDE 450 UG/1
FILM, SOLUBLE BUCCAL
COMMUNITY
Start: 2020-11-06

## 2020-11-11 RX ORDER — OLANZAPINE 2.5 MG/1
2.5 TABLET ORAL EVERY EVENING
COMMUNITY
Start: 2020-09-04

## 2020-11-11 RX ORDER — RIZATRIPTAN BENZOATE 5 MG/1
TABLET ORAL
COMMUNITY
Start: 2020-10-21

## 2020-11-11 RX ORDER — LORAZEPAM 2 MG/1
TABLET ORAL
COMMUNITY
Start: 2020-10-05 | End: 2020-11-11 | Stop reason: ALTCHOICE

## 2020-11-11 RX ORDER — VITAMIN E 268 MG
400 CAPSULE ORAL DAILY
COMMUNITY

## 2020-11-11 RX ORDER — AMLODIPINE BESYLATE 2.5 MG/1
2.5 TABLET ORAL DAILY
COMMUNITY
Start: 2020-09-04

## 2020-11-11 RX ORDER — ANORECTAL OINTMENT 15.7; .44; 24; 20.6 G/100G; G/100G; G/100G; G/100G
OINTMENT TOPICAL 2 TIMES DAILY
COMMUNITY

## 2020-11-11 RX ORDER — VALPROIC ACID 250 MG/1
500 CAPSULE, LIQUID FILLED ORAL EVERY 12 HOURS
COMMUNITY
Start: 2020-10-21

## 2020-11-11 RX ORDER — OMEPRAZOLE 20 MG/1
20 CAPSULE, DELAYED RELEASE ORAL
COMMUNITY
Start: 2020-11-03

## 2020-11-11 RX ORDER — AMITRIPTYLINE HYDROCHLORIDE 50 MG/1
50 TABLET, FILM COATED ORAL EVERY EVENING
COMMUNITY
Start: 2020-09-03

## 2020-11-11 RX ORDER — BIOTIN 1 MG
1 TABLET ORAL DAILY
COMMUNITY

## 2020-11-13 ENCOUNTER — TELEPHONE (OUTPATIENT)
Dept: NEUROLOGY | Facility: CLINIC | Age: 60
End: 2020-11-13

## 2020-11-19 ENCOUNTER — TELEPHONE (OUTPATIENT)
Dept: NEUROLOGY | Facility: CLINIC | Age: 60
End: 2020-11-19

## 2021-05-24 ENCOUNTER — TELEPHONE (OUTPATIENT)
Dept: NEUROLOGY | Facility: CLINIC | Age: 61
End: 2021-05-24

## 2021-05-24 NOTE — TELEPHONE ENCOUNTER
I left patient a message to call me back and confirm if she is keeping her appointment with Dr Sary Zheng on Wednesday 05/26/21 11am     She has not done any of the tests or labs Dr Sary Zheng wanted her to have done prior to this visit  Please confirm with patient or transfer call to me  Thank you

## 2022-03-10 NOTE — PROGRESS NOTES
Assessment and Plan:   Jorge Rendon is a 59 y o   female who presents for follow up of her osteoarthritis and complex regional pain syndrome  Her Vit  D level earlier this year was very low at 6, so she has significant Vit  D deficiency - initiated ergocalciferol 50,000 units po weekly  She can continue taking Ca 600mg po bid, and Vit  D 2,000 units po daily  I do not believe patient has an underlying autoimmune disease that needs regular rheumatological follow-up  Use diclofenac solution as needed for joint pain  Can try duloxetine (Cymbalta) daily for chronic pain  Take high dose Vit  D pill once a week  PCP can continue to refill medications as needed  Water therapy referral made    Return to clinic as needed    Plan:  Diagnoses and all orders for this visit:    Osteoarthritis, unspecified osteoarthritis type, unspecified site  -     DULoxetine (CYMBALTA) 20 mg capsule; Take 1 capsule (20 mg total) by mouth daily  -     Diclofenac Sodium 1 5 % SOLN; Apply 80 mg topically 2 (two) times a day as needed (as needed)  -     SL Aquatic Therapy    Vitamin D deficiency  -     ergocalciferol (VITAMIN D2) 50,000 units; Take 1 capsule (50,000 Units total) by mouth once a week    Type 2 diabetes mellitus without complication, without long-term current use of insulin (HCC)    Complex regional pain syndrome type 1, affecting unspecified site  -     DULoxetine (CYMBALTA) 20 mg capsule; Take 1 capsule (20 mg total) by mouth daily  -     SL Aquatic Therapy    Follow-up plan: RTC as needed        Rheumatic Disease Summary  Initial visit 5/11/20: Patient presented as a Rheumatology consult referred by Dr Joseph Salazar at [address] [place] [address] 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   Reviewed with patient that 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, and that she would benefit most by dermatological evaluation; referred her to [facility] for further evaluation and treatment  Restarted her on doxycycline at 100mg po bid for management of this condition until she saw Dermatology  Ordered inflammatory arthritis lab work-up below to further evaluate her joint pain, which returned with borderline positive anti-CCP (clinically insignificant) and elevated ESR, which was nonspecific and less than what it had been on her outside labs  I did not believe that patient had an underlying inflammatory arthritis  Prescribed diclofenac gel to apply to painful joints as needed, and amitriptyline 25mg po qhs for her chronic pain symptoms  PCP could continue to refill these medications if they are helpful  Patient may return to Rheumatology clinic as needed  HPI  Barbie Salcido is a 59 y o   female who presents for follow up of her osteoarthritis and complex regional pain syndrome  Last clinic visit was 5/11/20 which was her initial visit  Since then, she was found to have a pseudocyst on her pancreas  Her knees bother her  She takes Ca 600mg po bid, and Vit  D 2,000 units (50 mcg) daily  She has fractured her wrist and both feet due to neuropathy, and ambulates with a cane  The following portions of the patient's history were reviewed and updated as appropriate: allergies, current medications, past family history, past medical history, past social history, past surgical history and problem list     Review of Systems:   Review of Systems   Constitutional: Positive for fatigue  HENT: Positive for sinus pressure, sinus pain and trouble swallowing  Negative for mouth sores  Dry mouth   Eyes: Negative for pain  Dry eyes   Respiratory: Positive for shortness of breath and wheezing  Cardiovascular: Negative for leg swelling  Gastrointestinal: Positive for abdominal pain, constipation and nausea  Indigestion/heartburn   Endocrine: Positive for polydipsia  Genitourinary: Positive for dysuria and frequency     Musculoskeletal: Positive for arthralgias, back pain, myalgias and neck pain  Negative for joint swelling  Skin: Positive for color change and rash  Allergic/Immunologic: Positive for environmental allergies  Neurological: Positive for dizziness, weakness, numbness and headaches  Hematological: Negative for adenopathy  Bruises/bleeds easily  Psychiatric/Behavioral: Negative for sleep disturbance         Home Medications:    Current Outpatient Medications:     albuterol (PROVENTIL HFA) 90 mcg/act inhaler, Inhale 2 puffs 2 (two) times a day as needed, Disp: , Rfl:     amitriptyline (ELAVIL) 50 mg tablet, Take 50 mg by mouth every evening, Disp: , Rfl:     amLODIPine (NORVASC) 2 5 mg tablet, Take 2 5 mg by mouth daily, Disp: , Rfl:     aspirin 81 mg chewable tablet, Chew 81 mg, Disp: , Rfl:     atorvastatin (LIPITOR) 10 mg tablet, Take 10 mg by mouth daily, Disp: , Rfl: 0    Belbuca 450 MCG FILM, PLACE 1 FILM UNDER THE LIP EVERY 12 HOURS, Disp: , Rfl:     cetirizine (ZYRTEC ALLERGY) 10 mg tablet, Take 10 mg by mouth, Disp: , Rfl:     Cholecalciferol (Vitamin D3) 25 MCG (1000 UT) CAPS, Take 1 capsule by mouth daily, Disp: , Rfl:     diclofenac sodium (VOLTAREN) 1 %, Apply 4 g topically 4 (four) times a day as needed (pain), Disp: 100 g, Rfl: 6    Diclofenac Sodium 1 5 % SOLN, Apply 80 mg topically 2 (two) times a day as needed (as needed), Disp: 150 mL, Rfl: 3    DULoxetine (CYMBALTA) 20 mg capsule, Take 1 capsule (20 mg total) by mouth daily, Disp: 30 capsule, Rfl: 6    ergocalciferol (VITAMIN D2) 50,000 units, Take 1 capsule (50,000 Units total) by mouth once a week, Disp: 12 capsule, Rfl: 1    menthol-zinc oxide (Calmoseptine) 0 44-20 6 % OINT, Apply topically 2 (two) times a day, Disp: , Rfl:     miconazole (Miconazorb AF) 2 % powder, Apply topically as needed for itching, Disp: , Rfl:     montelukast (SINGULAIR) 10 mg tablet, Take 10 mg by mouth daily, Disp: , Rfl: 0    morphine (MSIR) 30 MG tablet, Take 30 mg by mouth every 4 (four) hours as needed, Disp: , Rfl:     OLANZapine (ZyPREXA) 2 5 mg tablet, Take 2 5 mg by mouth every evening, Disp: , Rfl:     omeprazole (PriLOSEC) 20 mg delayed release capsule, Take 20 mg by mouth daily before breakfast, Disp: , Rfl:     rizatriptan (MAXALT) 5 MG tablet, TAKE 1 TABLET BY MOUTH ONE TIME AS NEEDED FOR MIGRAINE  MAY REPEAT IN 2 HOURS IF UNRESOLVED  DO NOT EXCEED 30MG IN 24 HOURS , Disp: , Rfl:     tiZANidine (ZANAFLEX) 4 mg tablet, , Disp: , Rfl:     valproic acid (DEPAKENE) 250 mg capsule, Take 500 mg by mouth every 12 (twelve) hours, Disp: , Rfl:     vitamin E, tocopherol, 400 units capsule, Take 400 Units by mouth daily, Disp: , Rfl:     Objective:    Vitals:    03/11/22 1601   BP: 124/78   Pulse: 80   Temp: 97 6 °F (36 4 °C)   Weight: 82 8 kg (182 lb 9 6 oz)   Height: 5' 5" (1 651 m)       Physical Exam  Constitutional:       General: She is not in acute distress  HENT:      Head: Normocephalic and atraumatic  Eyes:      Conjunctiva/sclera: Conjunctivae normal    Cardiovascular:      Rate and Rhythm: Normal rate and regular rhythm  Heart sounds: S1 normal and S2 normal  No friction rub  Pulmonary:      Effort: Pulmonary effort is normal  No respiratory distress  Breath sounds: Normal breath sounds  No wheezing, rhonchi or rales  Musculoskeletal:      Cervical back: Neck supple  Comments: Ambulates with a cane   Skin:     Coloration: Skin is not pale  Neurological:      Mental Status: She is alert  Mental status is at baseline  Psychiatric:         Mood and Affect: Mood normal          Behavior: Behavior normal        Reviewed labs and imaging  Imaging: Outside Imaging from [address] [place] [address] scan 1/31/22  The examination is reviewed using the [address] [facility] criteria  There is osteopenia as measured by the bone mineral density of the femoral neck  1  The lumbar spine has bone mineral density that is in a normal range     2  The hip has bone mineral density of osteopenia with measurements that show   increased risk for fracture  Right Elbow x-rays 4/30/20  IMPRESSION:  1  Healing nondisplaced intra-articular fracture of the radial head      Cervical Spine x-rays 4/30/19  1  Anterior fusion of C5-6 the bodies with associated the discogenic disease at C4-5 and C6-7     Lumbar Spine x-rays 4/30/19  1  Multilevel discogenic disease, facet sclerosis and no acute lumbar  compression fracture      Labs:   Outside Skin Biopsy 9/11/17 - skin scrapings from rash revealing fibers resembling Morgellons' fibers    Labs:   No visits with results within 6 Month(s) from this visit     Latest known visit with results is:   Office Visit on 05/11/2020   Component Date Value Ref Range Status    CRP 05/11/2020 <3 0  <3 0 mg/L Final    Sed Rate 05/11/2020 51* 0 - 20 mm/hour Final    Rheumatoid Factor 05/11/2020 Negative  Negative Final    Cyclic Citrullin Peptide Ab 05/11/2020 21* 0 - 19 units Final                              Negative               <20                            Weak positive      20 - 39                            Moderate positive  40 - 59                            Strong positive        >59    GRAHAM 05/11/2020 Negative  Negative Final

## 2022-03-11 ENCOUNTER — TELEPHONE (OUTPATIENT)
Dept: OBGYN CLINIC | Facility: HOSPITAL | Age: 62
End: 2022-03-11

## 2022-03-11 ENCOUNTER — OFFICE VISIT (OUTPATIENT)
Dept: RHEUMATOLOGY | Facility: CLINIC | Age: 62
End: 2022-03-11
Payer: COMMERCIAL

## 2022-03-11 VITALS
WEIGHT: 182.6 LBS | HEART RATE: 80 BPM | TEMPERATURE: 97.6 F | HEIGHT: 65 IN | DIASTOLIC BLOOD PRESSURE: 78 MMHG | SYSTOLIC BLOOD PRESSURE: 124 MMHG | BODY MASS INDEX: 30.42 KG/M2

## 2022-03-11 DIAGNOSIS — E55.9 VITAMIN D DEFICIENCY: ICD-10-CM

## 2022-03-11 DIAGNOSIS — E11.9 TYPE 2 DIABETES MELLITUS WITHOUT COMPLICATION, WITHOUT LONG-TERM CURRENT USE OF INSULIN (HCC): ICD-10-CM

## 2022-03-11 DIAGNOSIS — G90.50 COMPLEX REGIONAL PAIN SYNDROME TYPE 1, AFFECTING UNSPECIFIED SITE: ICD-10-CM

## 2022-03-11 DIAGNOSIS — M19.90 OSTEOARTHRITIS, UNSPECIFIED OSTEOARTHRITIS TYPE, UNSPECIFIED SITE: Primary | ICD-10-CM

## 2022-03-11 PROCEDURE — 99214 OFFICE O/P EST MOD 30 MIN: CPT | Performed by: INTERNAL MEDICINE

## 2022-03-11 RX ORDER — DICLOFENAC SODIUM 16.05 MG/ML
80 SOLUTION TOPICAL 2 TIMES DAILY PRN
Qty: 150 ML | Refills: 3 | Status: SHIPPED | OUTPATIENT
Start: 2022-03-11

## 2022-03-11 RX ORDER — ERGOCALCIFEROL 1.25 MG/1
50000 CAPSULE ORAL WEEKLY
Qty: 12 CAPSULE | Refills: 1 | Status: SHIPPED | OUTPATIENT
Start: 2022-03-11 | End: 2022-08-01

## 2022-03-11 RX ORDER — DULOXETIN HYDROCHLORIDE 20 MG/1
20 CAPSULE, DELAYED RELEASE ORAL DAILY
Qty: 30 CAPSULE | Refills: 6 | Status: SHIPPED | OUTPATIENT
Start: 2022-03-11

## 2022-03-11 NOTE — PATIENT INSTRUCTIONS
Use diclofenac solution as needed for joint pain  Can try duloxetine (Cymbalta) daily  Take high dose Vit  D pill once a week  PCP can continue to refill as needed  Water therapy referral made    Return to clinic as needed    Osteoarthritis   AMBULATORY CARE:   Osteoarthritis  occurs when cartilage (tissue that cushions a joint) wears away slowly and causes the bones to rub together  Osteoarthritis (OA) is a long-term condition that often affects the hands, neck, lower back, knees, and hips  OA is also called arthrosis or degenerative joint disease  Common signs and symptoms include the following:   · Joint pain that gets worse when you move the joint     · Joint stiffness that decreases after you move the joint     · Decreased range of movement     · Hard, bony enlargement on your fingers or toes    · A grinding or cracking sound when you move your joint    Call your doctor or specialist if:   · You have severe pain  · You cannot move your joint  · You have a fever  · Your joint is red and tender  · You have questions or concerns about your condition or care  Treatment for osteoarthritis  may include any of the following:  · Acetaminophen  decreases pain and fever  It is available without a doctor's order  Ask how much to take and how often to take it  Follow directions  Read the labels of all other medicines you are using to see if they also contain acetaminophen, or ask your doctor or pharmacist  Acetaminophen can cause liver damage if not taken correctly  Do not use more than 4 grams (4,000 milligrams) total of acetaminophen in one day  · NSAIDs , such as ibuprofen, help decrease swelling, pain, and fever  This medicine is available with or without a doctor's order  NSAIDs can cause stomach bleeding or kidney problems in certain people  If you take blood thinner medicine, always ask your healthcare provider if NSAIDs are safe for you   Always read the medicine label and follow directions  · Capsaicin cream  may help decrease pain in your joint  · Prescription pain medicine  may be given  Ask your healthcare provider how to take this medicine safely  Some prescription pain medicines contain acetaminophen  Do not take other medicines that contain acetaminophen without talking to your healthcare provider  Too much acetaminophen may cause liver damage  Prescription pain medicine may cause constipation  Ask your healthcare provider how to prevent or treat constipation  · A steroid injection  may be given if your symptoms get worse  · Physical therapy  is used to teach you exercises to help improve movement and strength, and to decrease pain  A physical therapist may move an area with his or her hands  For example, he or she may move your leg in certain ways to treat osteoarthritis in your hip  · Ultrasound  may be used to treat osteoarthritis in certain areas, such as your knee  Ultrasound produces heat that can relieve pain  · Surgery  may be needed if other treatments do not work  Manage your symptoms:   · Stay active  Physical activity may reduce your pain and improve your ability to do daily activities  Avoid activities that cause pain  Ask your healthcare provider what type of exercise would be best for you  · Maintain a healthy weight  This helps decrease the strain on the joints in your back, hips, knees, ankles, and feet  Ask your healthcare provider what a healthy weight is for you  He or she can help you create a weight loss plan if you are overweight  · Use heat or ice on your joints as directed  Heat and ice help decrease pain, swelling, and muscle spasms  For heat, use a heating pad on a low setting for 20 minutes, or take a warm bath  For ice, use an ice pack, or put crushed ice in a plastic bag  Cover it with a towel before you place it on your joint  Use ice for 15 minutes every hour  · Massage the muscles around the joint    Massage helps relieve pain and stiffness  Your healthcare provider or a physical therapist can show you how to do this  If you have hip OA, another person may need to help you massage the area  · Use a cane, crutches, or a walker if directed  These help protect and relieve pressure on your ankle, knee, and hip joints  You may also be prescribed shoe inserts to decrease pressure in your joints  · Wear flat or low-heeled shoes  This will help decrease pain and reduce pressure on your ankle, knee, and hip joints  Follow up with your doctor as directed:  Write down your questions so you remember to ask them during your visits  © Copyright [employer] 2022 Information is for End User's use only and may not be sold, redistributed or otherwise used for commercial purposes  All illustrations and images included in CareNotes® are the copyrighted property of [employer]  or Mitch Herr  The above information is an  only  It is not intended as medical advice for individual conditions or treatments  Talk to your doctor, nurse or pharmacist before following any medical regimen to see if it is safe and effective for you

## 2022-03-11 NOTE — TELEPHONE ENCOUNTER
[facility] called, they would like clarification on the Dicloenac solution    Call Direct line [phone]

## 2022-03-12 NOTE — TELEPHONE ENCOUNTER
I clarified with the pharmacy that I meant for the prescription to be 1 spray 2-4 times a day as needed for joint pain

## 2022-03-15 ENCOUNTER — TELEPHONE (OUTPATIENT)
Dept: OBGYN CLINIC | Facility: HOSPITAL | Age: 62
End: 2022-03-15

## 2022-03-15 NOTE — TELEPHONE ENCOUNTER
Dr Chris Olguin  RE:  Med reaction    Patient states this is an FYI:     Patient states she only took 3 doses of the below med  She states with the last 2 doses, she turned very red and her face was swollen, and she vomited with the last one  This was within hrs  Patient will not take any more    Patient states she will stick with just the gel and the Vitamin D

## 2022-08-01 DIAGNOSIS — E55.9 VITAMIN D DEFICIENCY: ICD-10-CM

## 2022-08-01 RX ORDER — ERGOCALCIFEROL 1.25 MG/1
CAPSULE ORAL
Qty: 12 CAPSULE | Refills: 1 | Status: SHIPPED | OUTPATIENT
Start: 2022-08-01

## 2022-10-12 ENCOUNTER — TELEPHONE (OUTPATIENT)
Dept: NEUROLOGY | Facility: CLINIC | Age: 62
End: 2022-10-12

## 2022-10-12 NOTE — TELEPHONE ENCOUNTER
Dec 21 is fine     will place her on cxl list but she can see Ling Lamb  or arpit at [facility] [place]     None of the testing preformed in 2020 was done

## 2022-10-12 NOTE — TELEPHONE ENCOUNTER
Dayna Chavez  has been  not seen since 2020  and has not any testing done or a followup since the initial visit     She can see Angela Buchanan  or angel at [facility]

## 2022-10-12 NOTE — TELEPHONE ENCOUNTER
Patient called to schedule a follow up with Dr Joanne Pina because she had a seizure on 9-20-22 and I offered her 12-21-22 at 930 am and added her to the wait list and she accepted it

## 2022-10-13 NOTE — TELEPHONE ENCOUNTER
Hello,     I have called the patient to ask if she would like to be schedule with one of the NP's but no answer left her a voicemail to call back        Thank you,     Nelly Dempsey home

## 2022-10-13 NOTE — TELEPHONE ENCOUNTER
Patient's  Toni Guzmán left  stating Arsen Zuñiga will keep appointment in December with Dr Nacho Daigle  She does not want to be scheduled w/AP

## 2022-10-13 NOTE — TELEPHONE ENCOUNTER
Yes, I will keep you informed   I did just call again  both phone numbers but no answer      Thank you,     Babita Small

## 2022-11-14 ENCOUNTER — TELEPHONE (OUTPATIENT)
Dept: NEUROLOGY | Facility: CLINIC | Age: 62
End: 2022-11-14

## 2022-11-14 ENCOUNTER — OFFICE VISIT (OUTPATIENT)
Dept: NEUROLOGY | Facility: CLINIC | Age: 62
End: 2022-11-14

## 2022-11-14 VITALS
BODY MASS INDEX: 29.09 KG/M2 | WEIGHT: 174.6 LBS | SYSTOLIC BLOOD PRESSURE: 116 MMHG | TEMPERATURE: 96.6 F | HEIGHT: 65 IN | DIASTOLIC BLOOD PRESSURE: 56 MMHG | HEART RATE: 62 BPM

## 2022-11-14 DIAGNOSIS — G40.909 SEIZURE DISORDER (HCC): Primary | ICD-10-CM

## 2022-11-14 PROBLEM — M79.2 NEUROPATHIC PAIN: Status: ACTIVE | Noted: 2022-11-14
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
## 2022-11-14 NOTE — TELEPHONE ENCOUNTER
Please send today's note to [place] [facility] the information is in media      [address] , [address] , [place] , [address] [place] [address]

## 2022-11-14 NOTE — ASSESSMENT & PLAN NOTE
This is a 73-year-old patient with a history of presumed seizure disorder  She was last evaluated here in November of 2020 where workup was suggested  This was not proceeded with  Today she reports a recurrent seizure in September of 2022  Over the last week she has been complaining of myoclonic episodes and jerking  She does see pain management and they would like to alter her medications but this is limited due to her seizure history  In the interim I have asked her to obtain a Depakote level and ammonia level and EEG  They did have various questions regarding her need to be on seizure medicines, if this is the correct one and or other potential side effects  I have informed her that the dose workup needs to be finished 1st before some of these questions can be addressed  She does admit to intermittent issues with noncompliance  She can follow up in our offices in several months after the workup is completed and further questions could be addressed

## 2022-12-27 ENCOUNTER — HOSPITAL ENCOUNTER (OUTPATIENT)
Dept: NEUROLOGY | Facility: CLINIC | Age: 62
Discharge: HOME | End: 2022-12-27

## 2022-12-27 DIAGNOSIS — G40.909 SEIZURE DISORDER (HCC): ICD-10-CM

## 2023-01-12 ENCOUNTER — TELEPHONE (OUTPATIENT)
Dept: NEUROLOGY | Facility: CLINIC | Age: 63
End: 2023-01-12

## 2023-01-12 NOTE — TELEPHONE ENCOUNTER
----- Message from Donna Morales DO sent at 12/28/2022 10:02 AM EST -----  Please call the patient regarding her abnormal result  The eeg is abnormal and shows left focal temporal abnormality   She does need to stay on current dose of Depakote    Will need to discuss need for repeat brain imaging at upcoming visit  and alternative Aeds

## 2023-01-13 DIAGNOSIS — G40.909 SEIZURE DISORDER (HCC): Primary | ICD-10-CM

## 2023-01-13 RX ORDER — LEVETIRACETAM 500 MG/1
500 TABLET ORAL 2 TIMES DAILY
Qty: 60 TABLET | Refills: 3 | Status: SHIPPED | OUTPATIENT
Start: 2023-01-13 | End: 2023-02-01

## 2023-01-13 NOTE — TELEPHONE ENCOUNTER
Brando dunne:    My name is Thierno Like  My phone number is [phone]  patient of Dr Sheryl Hernandez, I received a phone call from from [facility] the other day  So I am returning her call  I also have Some questions  So if you can call me back, I'd appreciate it  Thank you

## 2023-01-13 NOTE — TELEPHONE ENCOUNTER
Pt called back in and is asking for a call back  Pt also has a question about lab work  Pt also had a seizure this week

## 2023-01-13 NOTE — TELEPHONE ENCOUNTER
Left detailed message for [facility] regarding Dr Callie Alvarez note  I asked patient to call back that she recv'd our messages and to leave name of Pharmacy to send new Seizure medication to

## 2023-01-13 NOTE — TELEPHONE ENCOUNTER
Spoke with Jeovany Oscar and advised of EEG results  Patient states she has had episodes this week witnessed by her   On Monday 1/9/23 she was sitting on her couch, her arms and legs began jerking, and then progressed to her whole body  States the jerking was so bad that it picked her "off the couch"  States she had 3 episodes like that within a short time frame the same evening  On Tuesday she states she had another episode where she was watching TV on her couch and her feet started twitching, then her arms and progressed to her entire body   doesn't recall how long it lasted but said "it wasn't that long" Jeovany Oscar does not remember any of the episode  Patient has not completed labs  Will go tomorrow to get them done  Asking if the morning before her medication is the best time to get labs drawn? Patient also states that her urine smells very strong Ammonia  Patient confirmed her Depakene is 500 mg po BID  Patient also mentioned her GI will be placing order for an US of her Pancreas  She has been having ongoing issues with Pancreatic pain       # [phone]  Please advise  Patient is scheduled with May Sharma on 1/25/23

## 2023-01-15 ENCOUNTER — APPOINTMENT (OUTPATIENT)
Dept: LAB | Facility: CLINIC | Age: 63
End: 2023-01-15

## 2023-01-15 DIAGNOSIS — G40.909 SEIZURE DISORDER (HCC): ICD-10-CM

## 2023-01-15 LAB
AMMONIA PLAS-SCNC: 41 UMOL/L (ref 11–35)
VALPROATE SERPL-MCNC: 106 UG/ML (ref 50–100)

## 2023-01-15 NOTE — TELEPHONE ENCOUNTER
Ammonia level 41  depakote level 105   After 1 week on keppra decrease depakote to 250 in am and 500 mg at night     Please let pt know

## 2023-01-16 NOTE — TELEPHONE ENCOUNTER
Spoke to patient  Discussed with her the changes of depakene prescription  (250mg in am, 500mg in pm) Patient verbalized understanding with medication changes  Patient reporting increase in "jerking"/"twitching" episodes  Lasts only a few seconds  Actually lifted her off the sofa when it occurred  This is occurring almost daily - 1-2 times per day  Today it has happened twice so far

## 2023-01-16 NOTE — TELEPHONE ENCOUNTER
received -Hi, my name is albino samuel  Birth date [birth year]  I see dr Viola Morales, I had an EEG done and I heard from dr, spoke with her on Friday, the 13th  Well, I was told my EEG was abnormal and she is changing my medications  So she wanted me to start on keppra   But I don't know how she wants me to discontinue or lesson the depakene  So if someone could please call me back  I don't know, she wants me on 2 anticonvulsants because she did mention that she would work on decreasing depakene  i just want to make sure that I take it right  Okay  [phone]  Thank you

## 2023-01-18 NOTE — TELEPHONE ENCOUNTER
Left patient detailed message and stressed to keep a written diary or log of her episodes  And to call our office if anything new comes up before her OV on 1/25/23

## 2023-01-25 ENCOUNTER — OFFICE VISIT (OUTPATIENT)
Dept: NEUROLOGY | Facility: CLINIC | Age: 63
End: 2023-01-25

## 2023-01-25 VITALS
HEART RATE: 61 BPM | SYSTOLIC BLOOD PRESSURE: 122 MMHG | OXYGEN SATURATION: 97 % | BODY MASS INDEX: 28.49 KG/M2 | DIASTOLIC BLOOD PRESSURE: 70 MMHG | HEIGHT: 65 IN | WEIGHT: 171 LBS | TEMPERATURE: 97.2 F

## 2023-01-25 DIAGNOSIS — G40.909 SEIZURE DISORDER (HCC): Primary | ICD-10-CM

## 2023-01-25 RX ORDER — APIXABAN 5 MG/1
5 TABLET, FILM COATED ORAL 2 TIMES DAILY
COMMUNITY
Start: 2023-01-04

## 2023-01-25 NOTE — PATIENT INSTRUCTIONS
- decrease Depakote to 250 mg twice per day  - Continue keppra (levetiracetam) 500 mg twice per day  - Have blood work done in about one week - have it done prior to taking morning medications  - Call the office with further events concerning for seizures  - Have your MRI done  - Plan for EMU admission

## 2023-01-25 NOTE — PROGRESS NOTES
Patient ID: Alma Delia Fernandez is a 57 y o  female with presumed seizure disorder, who is presenting to Neurology office for evaluation of seizures  Assessment/Plan:    Seizure-like activity ([facility] [address] )  Patient with recurrent episodes of seizure like activity, recently transitioning from Depakote to levetiracetam  She is currently on Depakote 250-500 and levetiracetam 500 mg BID  Events started back in 2009, recently worsening involving extremity jerking  There was a recent episode of nocturnal incontinence  There may be possible tonic clonic seizures provoked by light flashing through the window in the car  There was a recent fall where she woke up on the floor and did not know what had happened  Given semiology of events, it is unclear if these episodes represent epileptic seizure vs another etiology including psychogenic non-epileptic events  No clear risk factors for seizures though does have a son who had a single febrile seizure  Prior MRI brain in 2019 without acute abnormality  Routine EEG in 2019 was normal  There was a recent EEG in December 2022 that revealed focal left temporal irregular delta activity  While the EEG is abnormal, there is no clear seizure tendency but given this finding, recommended MRI brain with and without contrast with seizure protocol to rule out underlying structural abnormality  She has been feeling tired on combination of Depakote and levetiracetam  Recommended decreasing Depakote to 250 mg BID given prior elevated level and high ammonia level  Continue with levetiracetam 500 mg BID and have blood work in about one week prior to taking morning medications  Can increase levetiracetam further if needed  Recommended patient call the office with further events or concerns  Plan for epilepsy monitoring unit admission to capture and characterize events to assist in guiding further treatment  Follow up in the office in 8 weeks  Subjective:  Alma Delia Fernandez is a 57 y o  female with presumed seizure disorder, who is presenting to Neurology office for evaluation of seizures  She was last seen in the office on 11/14/2022 by Dr Priti Prakash  At that time, it was noted that she was evaluated in the office in November 2020 and workup was suggested but patient did not complete  The patient had a recurrent seizure in September 2022 and at the time of visit with Dr Priti Prakash, she was complaining of myoclonic episodes and jerking  Recommended Depakote level, ammonia level and EEG  Noted intermittent issues with medication non compliance  Recommended follow up in several months after workup was completed  She also complained of neuropathic pain throughout but was following with pain management  EEG was completed and noted left focal temporal abnormality  Dr Priti Prakash recommended continuing Depakote and discuss need for repeat MRI brain and alternative AEDs at upcoming visit  The patient reported episodes witness by  and her urine smelling strong of ammonia :  On Monday 1/9/23 she was sitting on her couch, her arms and legs began jerking, and then progressed to her whole body  States the jerking was so bad that it picked her "off the couch"  States she had 3 episodes like that within a short time frame the same evening  On Tuesday she states she had another episode where she was watching TV on her couch and her feet started twitching, then her arms and progressed to her entire body   doesn't recall how long it lasted but said "it wasn't that long" Lenny Guardadoserg does not remember any of the episode       It was recommended that she starte taking keppra 500 mg BID and potentially decrease Depakote in the future  Due to high Depakote level and elevated ammonia, recommended decreasing Depakote to 250-500 after one week of keppra  She reported jerking and twitching episodes lasting a few seconds, occurring almost daily, 1-2 times per day       The patient reports that her last seizure was on Sunday  Feels that her current medications are making her tired  She reports that she has been having myoclonic jerking for many years  Seizure activity started in 2009  She used to see a pain doctor and he did not have her see a neurologist back then  These past few years she feels jessica this has been getting wrose  They noticed over the past few months that the last few months she has jerks of any extremity  She has had them so bad in the past when her whole body lifts off of where she is  On Sunday her  went up to bed and she thoughts she was asleep  She woke up a few hours and looked at herself and her hair was all over the place and she was completely wet  She has never wet herself before  She has never had myoclonic jerks while walking so no falls due to this  Typically they occur when she is seated and relaxing or when she is right about to fall asleep  She did have a sleep test for apnea and did not have apnea  Sleep study done at [facility] November 2021  Back in 2009 with her first seizure she was in the car going to see her pain Dr  She just said to her  it is a good thing I don't have epilepsy because the sunlight was flickering through the window  She tightened all up and from what he recalls she had some jerking going on  Luckily he was able to pull over and EMTs were nearby at the convenience store  The next thing she new she was sitting with her legs out of the car and EMT was there  There was one in the house while sitting on the sofa and she was totally out of it for 26-27 minutes  She had tensing up initially and little jerky movements and once that subsided she had her eyes closed and he did not know if she was alive or dead  No movements  Right before the squad came she came out of it  This was scary for her   This was in 2019 when she went to [employer]  That was the last one until all the jerking started again  That was the year she had two really bad falls  One due to tripping and one while on the toilet and did not know what happened when she woke up on the floor  Her cockerspaniel would lie down and look at her in the past prior to her having seizures  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  Sees Dr [facility] in [place] [id number]  Taking eliquis after having aflutter and was started on this by cardiology  No longer taking aspirin, on eliquis instead  Seizure history from prior visit:  She describes four episodes of seizures  The most recent one occurred in March of 2019  The one in 2009 was characterized by loss of consciousness while driving in the car for several minutes  She reports that she son which shunting and she had fluttering of her eyes  It was thought to be due to an unclear etiology  At that point she was not started on medications  She had three additional once the most recent one was in March of 2019 where she was noted to have stiffness and movements of her arms and legs for 23 minutes  She was admitted to [facility] and thought to be due to potential withdrawal of benzodiazepines  An EEG and MRI imaging study was normal   The EEG was performed in the awake and drowsy state  She was placed on Depakene 250 mg tablets two tablets twice a day  Two years ago we discussed the type of seizures and I asked her to obtain laboratory studies and EEG  She did not proceed  In the interim she has had various hospitalizations for different things  More recently she did have a seizure characterized by staring spell on September 20th  This lasted several hours and she finally return to her baseline  She does admit to having intermittent issues with noncompliance       She does not drive        Current seizure medications:  - depakene 250-500  - levetiracetam 500 mg BID  Other medications as per Epic    Event/Seizure semiology:  1   myclonic jerks - every few days can be any extremity, typically while resting  If she is holding something she would drop it  2  LOC with stiffening, convulsions, and     Special Features  Status epilepticus: unclear (did have prolonged event in 2019)  Self Injury Seizures: none  Precipitating Factors: flickering lights, stress/overload of her brain, too much stimulation     Epilepsy Risk Factors:  Abnormal pregnancy:    no  Abnormal birth/:   no  Abnormal Development:   no  Febrile seizures, simple:   no  Febrile seizures, complex:   no  CNS infection:    no  Intellectual disability:    no  Cerebral palsy:    no  Head injury (moderate/severe):  no  CNS neoplasm:    no  CNS malformation:    no  Neurosurgical procedure:   no  Stroke:     no  Alcohol abuse:    no  Drug abuse:     no  Family history Sz/epilepsy:   yes: Son with single febrile  Prior physical/sexual/emotional abuse: no    Prior AEDs:  Lamotrigine initially for RSD (reflex sympathetic dystrophy) - stopped due to wanting to try something different  Prior Evaluation:  - MRI brain w wo contrast 3/6/2019 [facility]):   IMPRESSION:   No acute intracranial abnormality  Specifically, no acute infarction, acute   intracranial hemorrhage, intracranial mass or mass effect       - Routine EEG 3/6/2019 [facility]):  INTERPRETATION: Normal awake and drowsy EEG     - Routine EEG 2022:   EEG impression: This is an abnormal routine EEG recording due to:  1  Left temporal irregular delta activity  2  Generalized irregular delta activity in drowsiness  Clinical Interpretation: This abnormal study is consistent with a mild generalized encephalopathy and focal left temporal non-specific cerebral dysfunction  No electrographic seizures or interictal epileptiform discharges (seizure tendency) were seen  No diagnostic clinical events were captured         - Ambulatory EEG: none  - Video EEG: none  - PET scan brain : none  - Neuropsychologic testing: none    Psychiatric History:  Depression: yes  Anxiety: no  Bipolar disorder: yes  Psychosis: no  Psychiatric Admissions: 2017 for one week in [place] due to depression and SI      Her history was also obtained from her , who was present at today's visit  I reviewed prior neurology notes, most recent labs, as documented in Epic/Boxee, and summarized above  The following portions of the patient's history were reviewed and updated as appropriate: allergies, current medications, past family history, past medical history, past social history, past surgical history and problem list      Objective:    Blood pressure 122/70, pulse 61, temperature (!) 97 2 °F (36 2 °C), temperature source Temporal, height 5' 5" (1 651 m), weight 77 6 kg (171 lb), SpO2 97 %  Physical Exam  No apparent distress  Appears well nourished  Mood appropriate for situation     Neurologic Exam  Mental status- alert and oriented to person, place, and time  Speech appropriate for conversation  Good attention and knowledge  Cranial Nerves- PERRL, EOMS normal, facial sensation and muscles symmetric, hearing intact bilaterally to finger rubs, tongue midline, palate rise symmetrical, shoulder shrug symmetrical     Motor- No pronator drift  Appropriate strength  Moves all extremities freely  Mild tremor bilaterally on extension       Sensory-  Intact distally in all extremities to light touch  DTRs- 2+ and symmetric at bilateral biceps, 1+ and symmetric in bilateral lower extremities  Gait- antalgic gait  Coordination- FNF intact  ROS:  Review of Systems   Constitutional: Positive for fatigue  Negative for appetite change and fever  HENT: Negative  Negative for hearing loss, tinnitus, trouble swallowing and voice change  Eyes: Negative  Negative for photophobia, pain and visual disturbance  Respiratory: Negative  Negative for shortness of breath  Cardiovascular: Negative  Negative for palpitations  Gastrointestinal: Negative    Negative for nausea and vomiting  Endocrine: Negative  Negative for cold intolerance  Genitourinary: Negative  Negative for dysuria, frequency and urgency  Musculoskeletal: Negative  Negative for gait problem, myalgias and neck pain  Skin: Negative  Negative for rash  Allergic/Immunologic: Negative  Neurological: Positive for dizziness, seizures, light-headedness and headaches  Negative for tremors, syncope, facial asymmetry, speech difficulty, weakness and numbness  Hematological: Negative  Does not bruise/bleed easily  Psychiatric/Behavioral: Negative  Negative for confusion, hallucinations and sleep disturbance  I personally reviewed the ROS that was entered by the medical assistant    This note may have been created using voice recognition software  There may be unintentional errors such as grammatical errors, spelling errors, or pronoun errors

## 2023-01-25 NOTE — PROGRESS NOTES
Review of Systems   Constitutional: Positive for fatigue  Negative for appetite change and fever  HENT: Negative  Negative for hearing loss, tinnitus, trouble swallowing and voice change  Eyes: Negative  Negative for photophobia, pain and visual disturbance  Respiratory: Negative  Negative for shortness of breath  Cardiovascular: Negative  Negative for palpitations  Gastrointestinal: Negative  Negative for nausea and vomiting  Endocrine: Negative  Negative for cold intolerance  Genitourinary: Negative  Negative for dysuria, frequency and urgency  Musculoskeletal: Negative  Negative for gait problem, myalgias and neck pain  Skin: Negative  Negative for rash  Allergic/Immunologic: Negative  Neurological: Positive for dizziness, seizures, light-headedness and headaches  Negative for tremors, syncope, facial asymmetry, speech difficulty, weakness and numbness  Hematological: Negative  Does not bruise/bleed easily  Psychiatric/Behavioral: Negative  Negative for confusion, hallucinations and sleep disturbance

## 2023-01-29 ENCOUNTER — APPOINTMENT (EMERGENCY)
Dept: RADIOLOGY | Facility: HOSPITAL | Age: 63
End: 2023-01-29

## 2023-01-29 ENCOUNTER — HOSPITAL ENCOUNTER (INPATIENT)
Facility: HOSPITAL | Age: 63
LOS: 2 days | Discharge: HOME | End: 2023-02-01
Attending: EMERGENCY MEDICINE | Admitting: PSYCHIATRY & NEUROLOGY

## 2023-01-29 DIAGNOSIS — R56.9 SEIZURE-LIKE ACTIVITY (HCC): Primary | ICD-10-CM

## 2023-01-29 DIAGNOSIS — J06.9 URI (UPPER RESPIRATORY INFECTION): ICD-10-CM

## 2023-01-29 DIAGNOSIS — G25.3 MYOCLONUS: ICD-10-CM

## 2023-01-29 PROBLEM — R79.89 INCREASED AMMONIA LEVEL: Status: ACTIVE | Noted: 2023-01-29

## 2023-01-29 PROBLEM — J45.909 ASTHMA: Status: ACTIVE | Noted: 2023-01-29

## 2023-01-29 PROBLEM — G89.4 CHRONIC PAIN SYNDROME: Status: ACTIVE | Noted: 2023-01-29

## 2023-01-29 LAB
2HR DELTA HS TROPONIN: <0 NG/L
ALBUMIN SERPL BCP-MCNC: 3 G/DL (ref 3.5–5)
ALP SERPL-CCNC: 105 U/L (ref 46–116)
ALT SERPL W P-5'-P-CCNC: 13 U/L (ref 12–78)
AMMONIA PLAS-SCNC: 47 UMOL/L (ref 11–35)
ANION GAP SERPL CALCULATED.3IONS-SCNC: 5 MMOL/L (ref 4–13)
AST SERPL W P-5'-P-CCNC: 14 U/L (ref 5–45)
ATRIAL RATE: 62 BPM
BASOPHILS # BLD AUTO: 0.05 THOUSANDS/ÂΜL (ref 0–0.1)
BASOPHILS NFR BLD AUTO: 1 % (ref 0–1)
BILIRUB SERPL-MCNC: 0.27 MG/DL (ref 0.2–1)
BUN SERPL-MCNC: 13 MG/DL (ref 5–25)
CALCIUM ALBUM COR SERPL-MCNC: 9.1 MG/DL (ref 8.3–10.1)
CALCIUM SERPL-MCNC: 8.3 MG/DL (ref 8.3–10.1)
CARDIAC TROPONIN I PNL SERPL HS: 2 NG/L
CARDIAC TROPONIN I PNL SERPL HS: <2 NG/L
CHLORIDE SERPL-SCNC: 104 MMOL/L (ref 96–108)
CO2 SERPL-SCNC: 27 MMOL/L (ref 21–32)
CREAT SERPL-MCNC: 1.23 MG/DL (ref 0.6–1.3)
EOSINOPHIL # BLD AUTO: 0.58 THOUSAND/ÂΜL (ref 0–0.61)
EOSINOPHIL NFR BLD AUTO: 8 % (ref 0–6)
ERYTHROCYTE [DISTWIDTH] IN BLOOD BY AUTOMATED COUNT: 12.9 % (ref 11.6–15.1)
FLUAV RNA RESP QL NAA+PROBE: NEGATIVE
FLUBV RNA RESP QL NAA+PROBE: NEGATIVE
GFR SERPL CREATININE-BSD FRML MDRD: 46 ML/MIN/1.73SQ M
GLUCOSE SERPL-MCNC: 172 MG/DL (ref 65–140)
GLUCOSE SERPL-MCNC: 181 MG/DL (ref 65–140)
HCT VFR BLD AUTO: 36.2 % (ref 34.8–46.1)
HGB BLD-MCNC: 11.4 G/DL (ref 11.5–15.4)
IMM GRANULOCYTES # BLD AUTO: 0.01 THOUSAND/UL (ref 0–0.2)
IMM GRANULOCYTES NFR BLD AUTO: 0 % (ref 0–2)
LYMPHOCYTES # BLD AUTO: 2.2 THOUSANDS/ÂΜL (ref 0.6–4.47)
LYMPHOCYTES NFR BLD AUTO: 29 % (ref 14–44)
MCH RBC QN AUTO: 28.9 PG (ref 26.8–34.3)
MCHC RBC AUTO-ENTMCNC: 31.5 G/DL (ref 31.4–37.4)
MCV RBC AUTO: 92 FL (ref 82–98)
MONOCYTES # BLD AUTO: 0.56 THOUSAND/ÂΜL (ref 0.17–1.22)
MONOCYTES NFR BLD AUTO: 7 % (ref 4–12)
NEUTROPHILS # BLD AUTO: 4.19 THOUSANDS/ÂΜL (ref 1.85–7.62)
NEUTS SEG NFR BLD AUTO: 55 % (ref 43–75)
NRBC BLD AUTO-RTO: 0 /100 WBCS
P AXIS: 34 DEGREES
PLATELET # BLD AUTO: 213 THOUSANDS/UL (ref 149–390)
PMV BLD AUTO: 9.9 FL (ref 8.9–12.7)
POTASSIUM SERPL-SCNC: 4.7 MMOL/L (ref 3.5–5.3)
PR INTERVAL: 162 MS
PROT SERPL-MCNC: 7.9 G/DL (ref 6.4–8.4)
QRS AXIS: 1 DEGREES
QRSD INTERVAL: 90 MS
QT INTERVAL: 404 MS
QTC INTERVAL: 410 MS
RBC # BLD AUTO: 3.94 MILLION/UL (ref 3.81–5.12)
RSV RNA RESP QL NAA+PROBE: NEGATIVE
SARS-COV-2 RNA RESP QL NAA+PROBE: NEGATIVE
SODIUM SERPL-SCNC: 136 MMOL/L (ref 135–147)
T WAVE AXIS: 12 DEGREES
VALPROATE SERPL-MCNC: 67 UG/ML (ref 50–100)
VENTRICULAR RATE: 62 BPM
WBC # BLD AUTO: 7.59 THOUSAND/UL (ref 4.31–10.16)

## 2023-01-29 RX ORDER — SODIUM CHLORIDE 9 MG/ML
3 INJECTION INTRAVENOUS
Status: DISCONTINUED | OUTPATIENT
Start: 2023-01-29 | End: 2023-02-01 | Stop reason: HOSPADM

## 2023-01-29 RX ORDER — ATORVASTATIN CALCIUM 10 MG/1
10 TABLET, FILM COATED ORAL DAILY
Status: DISCONTINUED | OUTPATIENT
Start: 2023-01-30 | End: 2023-02-01 | Stop reason: HOSPADM

## 2023-01-29 RX ORDER — AMITRIPTYLINE HYDROCHLORIDE 50 MG/1
50 TABLET, FILM COATED ORAL EVERY EVENING
Status: DISCONTINUED | OUTPATIENT
Start: 2023-01-29 | End: 2023-02-01 | Stop reason: HOSPADM

## 2023-01-29 RX ORDER — MELATONIN
1000 DAILY
Status: DISCONTINUED | OUTPATIENT
Start: 2023-01-30 | End: 2023-02-01 | Stop reason: HOSPADM

## 2023-01-29 RX ORDER — PANTOPRAZOLE SODIUM 40 MG/1
40 TABLET, DELAYED RELEASE ORAL
Status: DISCONTINUED | OUTPATIENT
Start: 2023-01-30 | End: 2023-02-01 | Stop reason: HOSPADM

## 2023-01-29 RX ORDER — ACETAMINOPHEN 325 MG/1
650 TABLET ORAL EVERY 6 HOURS PRN
Status: DISCONTINUED | OUTPATIENT
Start: 2023-01-29 | End: 2023-01-30

## 2023-01-29 RX ORDER — LORAZEPAM 2 MG/ML
1 INJECTION INTRAMUSCULAR ONCE
Status: COMPLETED | OUTPATIENT
Start: 2023-01-29 | End: 2023-01-29

## 2023-01-29 RX ORDER — ALBUTEROL SULFATE 90 UG/1
2 AEROSOL, METERED RESPIRATORY (INHALATION) EVERY 4 HOURS PRN
Status: DISCONTINUED | OUTPATIENT
Start: 2023-01-29 | End: 2023-02-01 | Stop reason: HOSPADM

## 2023-01-29 RX ORDER — LORAZEPAM 2 MG/ML
2 INJECTION INTRAMUSCULAR DAILY PRN
Status: DISCONTINUED | OUTPATIENT
Start: 2023-01-29 | End: 2023-02-01 | Stop reason: HOSPADM

## 2023-01-29 RX ORDER — AMLODIPINE BESYLATE 2.5 MG/1
2.5 TABLET ORAL DAILY
Status: DISCONTINUED | OUTPATIENT
Start: 2023-01-30 | End: 2023-01-31

## 2023-01-29 RX ADMIN — LEVETIRACETAM 1500 MG: 100 INJECTION, SOLUTION INTRAVENOUS at 15:19

## 2023-01-29 RX ADMIN — AMITRIPTYLINE HYDROCHLORIDE 50 MG: 50 TABLET, FILM COATED ORAL at 21:32

## 2023-01-29 RX ADMIN — VALPROATE SODIUM 250 MG: 100 INJECTION, SOLUTION INTRAVENOUS at 22:42

## 2023-01-29 RX ADMIN — APIXABAN 5 MG: 5 TABLET, FILM COATED ORAL at 21:32

## 2023-01-29 RX ADMIN — LEVETIRACETAM 1000 MG: 100 INJECTION, SOLUTION INTRAVENOUS at 21:33

## 2023-01-29 NOTE — ED ATTENDING ATTESTATION
1/29/2023  IRhianna DO, saw and evaluated the patient  I have discussed the patient with the resident/non-physician practitioner and agree with the resident's/non-physician practitioner's findings, Plan of Care, and MDM as documented in the resident's/non-physician practitioner's note, except where noted  All available labs and Radiology studies were reviewed  I was present for key portions of any procedure(s) performed by the resident/non-physician practitioner and I was immediately available to provide assistance  At this point I agree with the current assessment done in the Emergency Department  I have conducted an independent evaluation of this patient a history and physical is as follows:      68-year-old woman with reported history of focal seizures presents for evaluation of reported worsening of her focal seizures  Per chart review patient was on valproic acid and was being transitioned to levetiracetam   However it is unclear if she is taking either of these as directed as it seems like she did not like the side effects of levetiracetam   Per my evaluation patient had received lorazepam 2 mg IV in route and was somewhat drowsy  I noticed periodic nonspastic movements of her bilateral feet and ankles  When I awoke the patient to ask if her seizures were better at this time she nodded yes and then I asked her what part of her body was seizing and she said arms and then I clarified both arms and she said yes and then nodded off again  Impression: History of focal seizures, today presenting as nonepileptic seizure activity plan:  We will check valproic acid, levetiracetam levels, ammonia level, metabolic panel likely load with levetiracetam and consult neurology      ED Course         Critical Care Time  Procedures
No

## 2023-01-29 NOTE — ED PROVIDER NOTES
History  Chief Complaint   Patient presents with   • Seizure - Prior Hx Of     Per EMS, Pt's  called d/t pt c/o cp and sob  Upon arrival EMS reports pt having focal seizures and given 2mg Ativan      68-year-old female is presenting with concern for ongoing seizure-like activity  Patient has a documented history of prior focal partial seizures  She had been taking Depakene for antiepileptic up until last week when she was seen by neurology and they began to taper her Depakene in favor of levetiracetam which was begun around 1/25/2023  This afternoon, around 1230 the patient was seated on her couch watching television when she suddenly began having myoclonic jerking in her right arm that then spread to her bilateral feet  The patient states that she has been taking her antiepileptics just as prescribed  Patient called EMS when the symptoms of jerking were worsening over the course of the next hour  Patient presented to the emergency department with ongoing right upper extremity jerking motions  EMS reports that they administered 2 mg of IV Ativan just prior to arrival in the emergency department  Patient arrived somewhat sleepy but easily arousable, appropriate, cooperative, and following all commands   was present at time of examination and states that the patient has been acting totally like her normal self just prior to the events of today  She never lost consciousness, has no recent trauma, and he reports that only 1 time before has she had a more generalized seizure but that was in 2019  He does state that since switching to Keppra earlier in the week she has been having significant fatigue as a possible side effect  Upon arrival in the emergency department, I ordered a load of Keppra 1500 mg IV    Patient reports no chest pain to me however according to EMS crew upon arrival to the patient's residence she was complaining of a crushing substernal chest pain that the patient does not recollect at this time  Prior to Admission Medications   Prescriptions Last Dose Informant Patient Reported? Taking?    Bel[place]uca 450 MCG FILM   Yes No   Sig: PLACE 1 FILM UNDER THE LIP EVERY 12 HOURS   Patient not taking: Reported on 11/14/2022   Cholecalciferol (Vitamin D3) 25 MCG (1000 UT) CAPS   Yes No   Sig: Take 1 capsule by mouth daily   Diclofenac Sodium 1 5 % SOLN   No No   Sig: Apply 80 mg topically 2 (two) times a day as needed (as needed)   Eliquis 5 MG   Yes No   Sig: Take 5 mg by mouth 2 (two) times a day   OLANZapine (ZyPREXA) 2 5 mg tablet   Yes No   Sig: Take 2 5 mg by mouth every evening   albuterol (PROVENTIL HFA,VENTOLIN HFA) 90 mcg/act inhaler   Yes No   Sig: Inhale 2 puffs 2 (two) times a day as needed   amLODIPine (NORVASC) 2 5 mg tablet   Yes No   Sig: Take 2 5 mg by mouth daily   amitriptyline (ELAVIL) 50 mg tablet   Yes No   Sig: Take 50 mg by mouth every evening   atorvastatin (LIPITOR) 10 mg tablet   Yes No   Sig: Take 10 mg by mouth daily   ergocalciferol (VITAMIN D2) 50,000 units   No No   Sig: TAKE 1 CAPSULE BY MOUTH ONCE WEEKLY   levETIRAcetam (Keppra) 500 mg tablet   No No   Sig: Take 1 tablet (500 mg total) by mouth 2 (two) times a day   menthol-zinc oxide (Calmoseptine) 0 44-20 6 % OINT   Yes No   Sig: Apply topically 2 (two) times a day   miconazole (Miconazorb AF) 2 % powder   Yes No   Sig: Apply topically as needed for itching   morphine (MSIR) 30 MG tablet   Yes No   Sig: Take 30 mg by mouth every 4 (four) hours as needed   omeprazole (PriLOSEC) 20 mg delayed release capsule   Yes No   Sig: Take 20 mg by mouth daily before breakfast   tiZANidine (ZANAFLEX) 4 mg tablet   Yes No   valproic acid (DEPAKENE) 250 mg capsule   Yes No   Sig: Take 500 mg by mouth every 12 (twelve) hours      Facility-Administered Medications: None       Past Medical History:   Diagnosis Date   • Bipolar disorder (HCC)    • CRPS (complex regional pain syndrome type I)    • Depression    • Hepatitis C    • Skin disease        Past Surgical History:   Procedure Laterality Date   • BILATERAL TEMPOROMANDIBULAR JOINT ARTHROPLASTY  1987   • DENTAL SURGERY  2004   • NECK SURGERY  1997       Family History   Problem Relation Age of Onset   • No Known Problems Mother    • No Known Problems Father    • Cancer Daughter      I have reviewed and agree with the history as documented  E-Cigarette/Vaping   • E-Cigarette Use Never User      E-Cigarette/Vaping Substances   • Nicotine No    • THC No    • CBD No    • Flavoring No    • Other No    • Unknown No      Social History     Tobacco Use   • Smoking status: Former   • Smokeless tobacco: Never   Vaping Use   • Vaping Use: Never used   Substance Use Topics   • Alcohol use: Not Currently   • Drug use: Not Currently        Review of Systems   Constitutional: Positive for fatigue  Negative for chills and fever  HENT: Negative for ear pain and sore throat  Eyes: Negative for pain and visual disturbance  Respiratory: Negative for cough and shortness of breath  Cardiovascular: Negative for chest pain and palpitations  Gastrointestinal: Negative for abdominal pain, nausea and vomiting  Genitourinary: Negative for dysuria and hematuria  Musculoskeletal: Negative for arthralgias and back pain  Skin: Negative for color change and rash  Neurological: Positive for seizures and weakness (Generalized)  Negative for dizziness, syncope, light-headedness, numbness and headaches  All other systems reviewed and are negative        Physical Exam  ED Triage Vitals   Temperature Pulse Respirations Blood Pressure SpO2   01/29/23 1421 01/29/23 1419 01/29/23 1419 01/29/23 1419 01/29/23 1419   98 1 °F (36 7 °C) 68 17 (!) 172/68 92 %      Temp Source Heart Rate Source Patient Position - Orthostatic VS BP Location FiO2 (%)   01/29/23 1421 01/29/23 1419 01/29/23 1419 01/29/23 1419 --   Oral Monitor Lying Right arm       Pain Score       --                    Orthostatic Vital Signs  Vitals:    01/29/23 1419 01/29/23 1445 01/29/23 1700 01/29/23 1800   BP: (!) 172/68 151/67 153/76 157/82   Pulse: 68 64 (!) 52 (!) 52   Patient Position - Orthostatic VS: Lying Lying Lying Lying       Physical Exam  Vitals and nursing note reviewed  Constitutional:       General: She is not in acute distress  Appearance: She is well-developed  She is obese  HENT:      Head: Normocephalic and atraumatic  Right Ear: External ear normal       Left Ear: External ear normal       Nose: Nose normal  No congestion or rhinorrhea  Mouth/Throat:      Mouth: Mucous membranes are moist       Pharynx: Oropharynx is clear  No oropharyngeal exudate or posterior oropharyngeal erythema  Eyes:      General: No scleral icterus  Extraocular Movements: Extraocular movements intact  Conjunctiva/sclera: Conjunctivae normal       Pupils: Pupils are equal, round, and reactive to light  Cardiovascular:      Rate and Rhythm: Regular rhythm  Bradycardia present  Pulses: Normal pulses  Heart sounds: Normal heart sounds  No murmur heard  Pulmonary:      Effort: Pulmonary effort is normal  No respiratory distress  Breath sounds: Normal breath sounds  No wheezing or rhonchi  Abdominal:      General: Abdomen is flat  There is no distension  Palpations: Abdomen is soft  Tenderness: There is no abdominal tenderness  There is no guarding  Musculoskeletal:         General: No swelling  Cervical back: Neck supple  No rigidity  Right lower leg: No edema  Left lower leg: No edema  Lymphadenopathy:      Cervical: No cervical adenopathy  Skin:     General: Skin is warm and dry  Capillary Refill: Capillary refill takes less than 2 seconds  Coloration: Skin is not jaundiced  Findings: No rash  Neurological:      Mental Status: She is oriented to person, place, and time  GCS: GCS eye subscore is 4  GCS verbal subscore is 5  GCS motor subscore is 6  Cranial Nerves: No dysarthria or facial asymmetry  Sensory: Sensation is intact  No sensory deficit  Motor: Seizure activity present  No weakness or pronator drift  Comments: Requip myoclonic jerking noted in right upper extremity, and bilateral feet, patient has a repetitive twitching like motion with alternating plantarflexion and dorsiflexion, no clonus appreciated, normal tone, normal reflexes of the Achilles and patellar tendons           ED Medications  Medications   sodium chloride (PF) 0 9 % injection 3 mL (has no administration in time range)   albuterol (PROVENTIL HFA,VENTOLIN HFA) inhaler 2 puff (has no administration in time range)   amitriptyline (ELAVIL) tablet 50 mg (has no administration in time range)   amLODIPine (NORVASC) tablet 2 5 mg (has no administration in time range)   atorvastatin (LIPITOR) tablet 10 mg (has no administration in time range)   cholecalciferol (VITAMIN D3) tablet 1,000 Units (has no administration in time range)   apixaban (ELIQUIS) tablet 5 mg (has no administration in time range)   pantoprazole (PROTONIX) EC tablet 40 mg (has no administration in time range)   levETIRAcetam (KEPPRA) 1,500 mg in sodium chloride 0 9 % 100 mL IVPB (0 mg Intravenous Stopped 1/29/23 1534)   LORazepam (FOR EMS ONLY) (ATIVAN) 2 mg/mL injection 2 mg (0 mg Does not apply Given to EMS 1/29/23 1542)       Diagnostic Studies  Results Reviewed     Procedure Component Value Units Date/Time    COVID/FLU/RSV [[id number]]     Lab Status: No result Specimen: Nares from Nose     HS Troponin I 2hr [[id number]]  (Normal) Collected: 01/29/23 1654    Lab Status: Final result Specimen: Blood from Arm, Right Updated: 01/29/23 1739     hs TnI 2hr <2 ng/L      Delta 2hr hsTnI <0 ng/L     HS Troponin I 4hr [[id number]]     Lab Status: No result Specimen: Blood     HS Troponin 0hr (reflex protocol) [[id number]]  (Normal) Collected: 01/29/23 1428    Lab Status: Final result Specimen: Blood from Arm, Right Updated: 01/29/23 1516     hs TnI 0hr 2 ng/L     Valproic acid level, total [[id number]]  (Normal) Collected: 01/29/23 1428    Lab Status: Final result Specimen: Blood from Arm, Right Updated: 01/29/23 1510     Valproic Acid, Total 67 ug/mL     Comprehensive metabolic panel [[id number]]  (Abnormal) Collected: 01/29/23 1428    Lab Status: Final result Specimen: Blood from Arm, Right Updated: 01/29/23 1510     Sodium 136 mmol/L      Potassium 4 7 mmol/L      Chloride 104 mmol/L      CO2 27 mmol/L      ANION GAP 5 mmol/L      BUN 13 mg/dL      Creatinine 1 23 mg/dL      Glucose 181 mg/dL      Calcium 8 3 mg/dL      Corrected Calcium 9 1 mg/dL      AST 14 U/L      ALT 13 U/L      Alkaline Phosphatase 105 U/L      Total Protein 7 9 g/dL      Albumin 3 0 g/dL      Total Bilirubin 0 27 mg/dL      eGFR 46 ml/min/1 73sq m     Narrative:      [place] guidelines for Chronic Kidney Disease (CKD):   •  Stage 1 with normal or high GFR (GFR > 90 mL/min/1 73 square meters)  •  Stage 2 Mild CKD (GFR = 60-89 mL/min/1 73 square meters)  •  Stage 3A Moderate CKD (GFR = 45-59 mL/min/1 73 square meters)  •  Stage 3B Moderate CKD (GFR = 30-44 mL/min/1 73 square meters)  •  Stage 4 Severe CKD (GFR = 15-29 mL/min/1 73 square meters)  •  Stage 5 End Stage CKD (GFR <15 mL/min/1 73 square meters)  Note: GFR calculation is accurate only with a steady state creatinine    Ammonia [[id number]]  (Abnormal) Collected: 01/29/23 1431    Lab Status: Final result Specimen: Blood from Arm, Right Updated: 01/29/23 1507     Ammonia 47 umol/L     CBC and differential [[id number]]  (Abnormal) Collected: 01/29/23 1428    Lab Status: Final result Specimen: Blood from Arm, Right Updated: 01/29/23 1440     WBC 7 59 Thousand/uL      RBC 3 94 Million/uL      Hemoglobin 11 4 g/dL      Hematocrit 36 2 %      MCV 92 fL      MCH 28 9 pg      MCHC 31 5 g/dL      RDW 12 9 %      MPV 9 9 fL      Platelets 658 Thousands/uL      nRBC 0 /100 WBCs Neutrophils Relative 55 %      Immat GRANS % 0 %      Lymphocytes Relative 29 %      Monocytes Relative 7 %      Eosinophils Relative 8 %      Basophils Relative 1 %      Neutrophils Absolute 4 19 Thousands/µL      Immature Grans Absolute 0 01 Thousand/uL      Lymphocytes Absolute 2 20 Thousands/µL      Monocytes Absolute 0 56 Thousand/µL      Eosinophils Absolute 0 58 Thousand/µL      Basophils Absolute 0 05 Thousands/µL     Levetiracetam level [[id number]] Collected: 01/29/23 1428    Lab Status: In process Specimen: Blood from Arm, Right Updated: 01/29/23 1435    Fingerstick Glucose (POCT) [[id number]]  (Abnormal) Collected: 01/29/23 1423    Lab Status: Final result Updated: 01/29/23 1424     POC Glucose 172 mg/dl                  CT head without contrast   Final Result by Rosa Dawkins MD (01/29 1525)      No acute intracranial abnormality  Workstation performed: [id number]         X-ray chest 1 view portable    (Results Pending)         Procedures  Procedures    ECG interpreted by me  Date: 1/29/2023  Time: 1427  Rate: 62 bpm   Axis: Normal   Rhythm: Regular  No ST depressions or elevations noted  No QT prolongation  Some scooping of T wave in lead II and V6  Interpretation: Abnormal ECG  ED Course                                       Medical Decision Making  68-year-old female presenting with seizure-like activity with known seizure history  On Depakene and levetiracetam for antiepileptic control  Patient had recent medication changes week which may have triggered this onset of new seizure-like activity  Patient will be Keppra loaded here in the emergency department after receiving Ativan 2 mg on ambulance  Will evaluate for other causes with basic labs to include metabolic panel with liver function tests due to the Depakote  We will also screen ammonia  CT of the head ordered since patient has not had any recent head imaging and has new/worsening seizure-like activity      Case was discussed with neurology team who agrees to admit patient for further monitoring and to come up with plan for better AED control at home in light of attempting to wean from Depakote to levetiracetam   Patient is in agreement with this plan  CT scan unrevealing of organic cause of her seizures at this time  Labs appear more or less at patient's baseline  Myoclonus: acute illness or injury  Seizure-like activity [facility]): acute illness or injury  Amount and/or Complexity of Data Reviewed  Labs: ordered  Radiology: ordered  Risk  Prescription drug management  Decision regarding hospitalization  Disposition  Final diagnoses:   Seizure-like activity ([facility] [address] )   Myoclonus     Time reflects when diagnosis was documented in both MDM as applicable and the Disposition within this note     Time User Action Codes Description Comment    1/29/2023  5:39 PM Osvaldo Stein Add [R56 9] Seizure-like activity ([facility] [address] )     1/29/2023  5:39 PM Osvaldo Stein Add [G25 3] Myoclonus       ED Disposition     ED Disposition   Admit    Condition   Stable    Date/Time   Sun Jan 29, 2023  5:40 PM    Comment   Case was discussed with Dr Zaki Powers and the patient's admission status was agreed to be Admission Status: observation status to the service of Dr Zaki Powers   Follow-up Information    None         Patient's Medications   Discharge Prescriptions    No medications on file     No discharge procedures on file  PDMP Review     None           ED Provider  Attending physically available and evaluated Naif Leigh  CK managed the patient along with the ED Attending      Electronically Signed by         Stephanie Crook MD  02/03/23 0203

## 2023-01-30 ENCOUNTER — APPOINTMENT (OUTPATIENT)
Dept: RADIOLOGY | Facility: HOSPITAL | Age: 63
End: 2023-01-30

## 2023-01-30 ENCOUNTER — APPOINTMENT (OUTPATIENT)
Dept: NEUROLOGY | Facility: CLINIC | Age: 63
End: 2023-01-30

## 2023-01-30 PROBLEM — I48.92 ATRIAL FLUTTER (HCC): Status: ACTIVE | Noted: 2023-01-30

## 2023-01-30 PROBLEM — I10 ESSENTIAL HYPERTENSION: Status: ACTIVE | Noted: 2023-01-30

## 2023-01-30 LAB
ALBUMIN SERPL BCP-MCNC: 3 G/DL (ref 3.5–5)
ALP SERPL-CCNC: 78 U/L (ref 46–116)
ALT SERPL W P-5'-P-CCNC: 12 U/L (ref 12–78)
AMMONIA PLAS-SCNC: 29 UMOL/L (ref 11–35)
ANION GAP SERPL CALCULATED.3IONS-SCNC: 4 MMOL/L (ref 4–13)
AST SERPL W P-5'-P-CCNC: 12 U/L (ref 5–45)
ATRIAL RATE: 51 BPM
ATRIAL RATE: 57 BPM
BASOPHILS # BLD AUTO: 0.04 THOUSANDS/ÂΜL (ref 0–0.1)
BASOPHILS NFR BLD AUTO: 1 % (ref 0–1)
BILIRUB SERPL-MCNC: 0.32 MG/DL (ref 0.2–1)
BUN SERPL-MCNC: 10 MG/DL (ref 5–25)
CALCIUM ALBUM COR SERPL-MCNC: 9.5 MG/DL (ref 8.3–10.1)
CALCIUM SERPL-MCNC: 8.7 MG/DL (ref 8.3–10.1)
CHLORIDE SERPL-SCNC: 104 MMOL/L (ref 96–108)
CO2 SERPL-SCNC: 30 MMOL/L (ref 21–32)
CREAT SERPL-MCNC: 1.09 MG/DL (ref 0.6–1.3)
EOSINOPHIL # BLD AUTO: 0.5 THOUSAND/ÂΜL (ref 0–0.61)
EOSINOPHIL NFR BLD AUTO: 7 % (ref 0–6)
ERYTHROCYTE [DISTWIDTH] IN BLOOD BY AUTOMATED COUNT: 12.8 % (ref 11.6–15.1)
GFR SERPL CREATININE-BSD FRML MDRD: 53 ML/MIN/1.73SQ M
GLUCOSE SERPL-MCNC: 124 MG/DL (ref 65–140)
GLUCOSE SERPL-MCNC: 181 MG/DL (ref 65–140)
GLUCOSE SERPL-MCNC: 184 MG/DL (ref 65–140)
HCT VFR BLD AUTO: 38 % (ref 34.8–46.1)
HGB BLD-MCNC: 11.8 G/DL (ref 11.5–15.4)
IMM GRANULOCYTES # BLD AUTO: 0.02 THOUSAND/UL (ref 0–0.2)
IMM GRANULOCYTES NFR BLD AUTO: 0 % (ref 0–2)
LYMPHOCYTES # BLD AUTO: 2.3 THOUSANDS/ÂΜL (ref 0.6–4.47)
LYMPHOCYTES NFR BLD AUTO: 32 % (ref 14–44)
MCH RBC QN AUTO: 28.5 PG (ref 26.8–34.3)
MCHC RBC AUTO-ENTMCNC: 31.1 G/DL (ref 31.4–37.4)
MCV RBC AUTO: 92 FL (ref 82–98)
MONOCYTES # BLD AUTO: 0.6 THOUSAND/ÂΜL (ref 0.17–1.22)
MONOCYTES NFR BLD AUTO: 8 % (ref 4–12)
NEUTROPHILS # BLD AUTO: 3.67 THOUSANDS/ÂΜL (ref 1.85–7.62)
NEUTS SEG NFR BLD AUTO: 52 % (ref 43–75)
NRBC BLD AUTO-RTO: 0 /100 WBCS
P AXIS: 53 DEGREES
P AXIS: 61 DEGREES
PLATELET # BLD AUTO: 210 THOUSANDS/UL (ref 149–390)
PMV BLD AUTO: 10.1 FL (ref 8.9–12.7)
POTASSIUM SERPL-SCNC: 4.6 MMOL/L (ref 3.5–5.3)
PR INTERVAL: 164 MS
PR INTERVAL: 174 MS
PROCALCITONIN SERPL-MCNC: <0.05 NG/ML
PROT SERPL-MCNC: 7.5 G/DL (ref 6.4–8.4)
QRS AXIS: 17 DEGREES
QRS AXIS: 36 DEGREES
QRSD INTERVAL: 84 MS
QRSD INTERVAL: 88 MS
QT INTERVAL: 432 MS
QT INTERVAL: 456 MS
QTC INTERVAL: 420 MS
QTC INTERVAL: 420 MS
RBC # BLD AUTO: 4.14 MILLION/UL (ref 3.81–5.12)
SODIUM SERPL-SCNC: 138 MMOL/L (ref 135–147)
T WAVE AXIS: 15 DEGREES
T WAVE AXIS: 31 DEGREES
VENTRICULAR RATE: 51 BPM
VENTRICULAR RATE: 57 BPM
WBC # BLD AUTO: 7.13 THOUSAND/UL (ref 4.31–10.16)

## 2023-01-30 RX ORDER — LORATADINE 10 MG/1
10 TABLET ORAL DAILY
Status: DISCONTINUED | OUTPATIENT
Start: 2023-01-30 | End: 2023-02-01 | Stop reason: HOSPADM

## 2023-01-30 RX ORDER — TIZANIDINE 4 MG/1
4 TABLET ORAL
Status: DISCONTINUED | OUTPATIENT
Start: 2023-01-30 | End: 2023-02-01 | Stop reason: HOSPADM

## 2023-01-30 RX ORDER — ACETAMINOPHEN 325 MG/1
975 TABLET ORAL EVERY 8 HOURS SCHEDULED
Status: DISCONTINUED | OUTPATIENT
Start: 2023-01-30 | End: 2023-02-01 | Stop reason: HOSPADM

## 2023-01-30 RX ORDER — SODIUM CHLORIDE FOR INHALATION 0.9 %
3 VIAL, NEBULIZER (ML) INHALATION
Status: DISCONTINUED | OUTPATIENT
Start: 2023-01-30 | End: 2023-01-30

## 2023-01-30 RX ORDER — FLUTICASONE PROPIONATE 50 MCG
1 SPRAY, SUSPENSION (ML) NASAL DAILY
Status: DISCONTINUED | OUTPATIENT
Start: 2023-01-30 | End: 2023-02-01 | Stop reason: HOSPADM

## 2023-01-30 RX ORDER — GUAIFENESIN 600 MG/1
600 TABLET, EXTENDED RELEASE ORAL EVERY 12 HOURS SCHEDULED
Status: DISCONTINUED | OUTPATIENT
Start: 2023-01-30 | End: 2023-02-01 | Stop reason: HOSPADM

## 2023-01-30 RX ORDER — MORPHINE SULFATE 15 MG/1
30 TABLET ORAL 2 TIMES DAILY
Status: DISCONTINUED | OUTPATIENT
Start: 2023-01-30 | End: 2023-01-31

## 2023-01-30 RX ORDER — BENZONATATE 100 MG/1
100 CAPSULE ORAL 3 TIMES DAILY PRN
Status: DISCONTINUED | OUTPATIENT
Start: 2023-01-30 | End: 2023-02-01 | Stop reason: HOSPADM

## 2023-01-30 RX ORDER — INSULIN LISPRO 100 [IU]/ML
1-5 INJECTION, SOLUTION INTRAVENOUS; SUBCUTANEOUS
Status: DISCONTINUED | OUTPATIENT
Start: 2023-01-30 | End: 2023-02-01 | Stop reason: HOSPADM

## 2023-01-30 RX ORDER — HYDROMORPHONE HCL IN WATER/PF 6 MG/30 ML
0.2 PATIENT CONTROLLED ANALGESIA SYRINGE INTRAVENOUS ONCE
Status: COMPLETED | OUTPATIENT
Start: 2023-01-30 | End: 2023-01-30

## 2023-01-30 RX ORDER — HYDROMORPHONE HYDROCHLORIDE 2 MG/1
2 TABLET ORAL EVERY 8 HOURS PRN
Status: DISCONTINUED | OUTPATIENT
Start: 2023-01-30 | End: 2023-02-01 | Stop reason: HOSPADM

## 2023-01-30 RX ORDER — MORPHINE SULFATE 15 MG/1
30 TABLET ORAL 2 TIMES DAILY
Status: DISCONTINUED | OUTPATIENT
Start: 2023-01-30 | End: 2023-01-30

## 2023-01-30 RX ORDER — NYSTATIN 100000 [USP'U]/G
POWDER TOPICAL 2 TIMES DAILY
Status: DISCONTINUED | OUTPATIENT
Start: 2023-01-30 | End: 2023-02-01 | Stop reason: HOSPADM

## 2023-01-30 RX ADMIN — ALBUTEROL SULFATE 2 PUFF: 90 AEROSOL, METERED RESPIRATORY (INHALATION) at 15:12

## 2023-01-30 RX ADMIN — VALPROATE SODIUM 250 MG: 100 INJECTION, SOLUTION INTRAVENOUS at 09:28

## 2023-01-30 RX ADMIN — APIXABAN 5 MG: 5 TABLET, FILM COATED ORAL at 17:38

## 2023-01-30 RX ADMIN — GUAIFENESIN 600 MG: 600 TABLET, EXTENDED RELEASE ORAL at 21:28

## 2023-01-30 RX ADMIN — FLUTICASONE PROPIONATE 1 SPRAY: 50 SPRAY, METERED NASAL at 15:18

## 2023-01-30 RX ADMIN — ATORVASTATIN CALCIUM 10 MG: 10 TABLET, FILM COATED ORAL at 08:29

## 2023-01-30 RX ADMIN — AMLODIPINE BESYLATE 2.5 MG: 2.5 TABLET ORAL at 08:29

## 2023-01-30 RX ADMIN — NYSTATIN: 100000 POWDER TOPICAL at 17:41

## 2023-01-30 RX ADMIN — LEVETIRACETAM 1000 MG: 100 INJECTION, SOLUTION INTRAVENOUS at 08:34

## 2023-01-30 RX ADMIN — HYDROMORPHONE HYDROCHLORIDE 0.2 MG: 0.2 INJECTION, SOLUTION INTRAMUSCULAR; INTRAVENOUS; SUBCUTANEOUS at 17:38

## 2023-01-30 RX ADMIN — ACETAMINOPHEN 650 MG: 325 TABLET ORAL at 15:11

## 2023-01-30 RX ADMIN — BENZONATATE 100 MG: 100 CAPSULE ORAL at 10:24

## 2023-01-30 RX ADMIN — ACETAMINOPHEN 975 MG: 325 TABLET ORAL at 17:38

## 2023-01-30 RX ADMIN — HYDROMORPHONE HYDROCHLORIDE 2 MG: 2 TABLET ORAL at 23:01

## 2023-01-30 RX ADMIN — LORATADINE 10 MG: 10 TABLET ORAL at 15:12

## 2023-01-30 RX ADMIN — ACETAMINOPHEN 975 MG: 325 TABLET ORAL at 21:26

## 2023-01-30 RX ADMIN — PANTOPRAZOLE SODIUM 40 MG: 40 TABLET, DELAYED RELEASE ORAL at 06:12

## 2023-01-30 RX ADMIN — GUAIFENESIN 600 MG: 600 TABLET, EXTENDED RELEASE ORAL at 08:29

## 2023-01-30 RX ADMIN — GADOBUTROL 7 ML: 604.72 INJECTION INTRAVENOUS at 00:48

## 2023-01-30 RX ADMIN — LEVETIRACETAM 1000 MG: 100 INJECTION, SOLUTION INTRAVENOUS at 21:25

## 2023-01-30 RX ADMIN — VALPROATE SODIUM 250 MG: 100 INJECTION, SOLUTION INTRAVENOUS at 22:07

## 2023-01-30 RX ADMIN — MORPHINE SULFATE 30 MG: 15 TABLET ORAL at 16:05

## 2023-01-30 RX ADMIN — ACETAMINOPHEN 650 MG: 325 TABLET ORAL at 08:29

## 2023-01-30 RX ADMIN — AMITRIPTYLINE HYDROCHLORIDE 50 MG: 50 TABLET, FILM COATED ORAL at 17:38

## 2023-01-30 RX ADMIN — TIZANIDINE 4 MG: 4 TABLET ORAL at 21:26

## 2023-01-30 RX ADMIN — INSULIN LISPRO 1 UNITS: 100 INJECTION, SOLUTION INTRAVENOUS; SUBCUTANEOUS at 16:08

## 2023-01-30 RX ADMIN — CHOLECALCIFEROL TAB 25 MCG (1000 UNIT) 1000 UNITS: 25 TAB at 08:29

## 2023-01-30 RX ADMIN — APIXABAN 5 MG: 5 TABLET, FILM COATED ORAL at 08:29

## 2023-01-30 NOTE — ASSESSMENT & PLAN NOTE
Lab Results   Component Value Date    HGBA1C 6 0 (H) 07/15/2022       Recent Labs     01/29/23  1423   POCGLU 172*       Blood Sugar Average: Last 72 hrs:  (P) 172   Chronically diet controlled

## 2023-01-30 NOTE — ASSESSMENT & PLAN NOTE
She has history of chronic pain syndrome and takes morphine at home to control her pain  Plan:  · Discussed with Pain Management office  · Confirmed dosage regimen is:   · 30mg Morphine IR prn 4-6 hours   · 30mg Morphine ER BID   · Acetaminophen 650mg Q6H PRN

## 2023-01-30 NOTE — ASSESSMENT & PLAN NOTE
She reports having syx of a URI approximately 1 5 - 3 weeks ago  Has a sore throat now  Cough noted during exam   Afebrile, CBC does not show leukocytosis      Plan:  • COVID/flu/RSV negative  ? Procalcitonin ordered and normal  ? Possibly due to postnasal drip due to underlying a viral allergy  ?  Started on Flonase and Claritin and continue for a few days   • Closely monitor respiratory status

## 2023-01-30 NOTE — UTILIZATION REVIEW
Initial Clinical Review    OBSERVATION 1- 29 -23 TO INPATIENT 1-30 -23 FOR CONTINUED EVALUATION AND TREATMENT OF SEIZURE WITH VIDEO EEG AND IV ANTIEPILEPTIC MEDICATION  Admission: Date/Time/Statement:   Admission Orders (From admission, onward)     Ordered        01/30/23 1200  Inpatient Admission  Once            01/29/23 1747  Place in Observation  Once                      Orders Placed This Encounter   Procedures   • Place in Observation     Standing Status:   Standing     Number of Occurrences:   1     Order Specific Question:   Level of Care     Answer:   Med Surg [16]   • Inpatient Admission     Standing Status:   Standing     Number of Occurrences:   1     Order Specific Question:   Level of Care     Answer:   Level 2 Stepdown / HOT [14]     Order Specific Question:   Estimated length of stay     Answer:   More than 2 Midnights     Order Specific Question:   Certification     Answer:   I certify that inpatient services are medically necessary for this patient for a duration of greater than two midnights  See H&P and MD Progress Notes for additional information about the patient's course of treatment  ED Arrival Information     Expected   -    Arrival   1/29/2023 14:05    Acuity   Emergent            Means of arrival   Ambulance    Escorted by   [facility]    Service   Neurology    Admission type   Emergency            Arrival complaint   seizures           Chief Complaint   Patient presents with   • Seizure - Prior Hx Of     Per EMS, Pt's  called d/t pt c/o cp and sob  Upon arrival EMS reports pt having focal seizures and given 2mg Ativan        Initial Presentation: 57 y o  female presents to ed from home for evaluation and treatment of chest pain, shortness of breath and focal seizure witnessed by ems  Patient Depakene being weaned  Clinical assessment significant for seizure activity- right myoclonic jerking RUE, bilateral feet, GCS =11  Ammonia 47    Initially treated with iv depacon, iv keppra, elavil,  Received iv ativan by ems prior to arrival   Admit to observation for URI  , seizure  Neurology plan to reach out video EEG, MRI  Continue Keppra  Obtain levetiracetam level         Date: 1- 29-23 Day 2: observation changed to inpatient     24 hour video EEG monitoring  In progress  MRI brain without acute finding  Continue iv keppra and iv depacon  1-31-23  Inpatient med surg    1-30-23 to 1-31 2023 negative for seizure events  Per neurology, it is unclear whether patient's episodes were epileptic seizures given her ability to respond to name, however, there remains suspicion for seizures due to episodes occurring in the setting of recently decrease dose of VPA  Continue levetiracetam bid, discontinue VPA   MRI shows enlarged empty sella turcica wit inferior displacement of pituitary tissue  Continue iv keppra  Valproate discontinued  Discharged 2-1-23   No events captured on video EEG for 48 hours             ED Triage Vitals   01/29/23 1421 01/29/23 1419 01/29/23 1419 01/29/23 1419 01/29/23 1419   98 1 °F (36 7 °C) 68 17 (!) 172/68 92 %      Oral Monitor           Pain Score       01/30/23 0800       6          01/25/23 77 6 kg (171 lb)     Additional Vital Signs:     Date/  Time Temp Pulse Resp BP MAP  SpO2 Nasal Cannula O2 Flow Rate (L/min)   01/30/23 15:44:29 -- 74 17 156/108   Abnormal  124 95 % --   01/30/23 12:31:35 -- 64 -- 134/86 102 98 % --   01/30/23 0800 -- -- -- -- -- -- --   01/30/23 07:56:31 97 6 °F (36 4 °C) 61 18 172/95   Abnormal  121 98 % --   01/30/23 0230 -- 52   Abnormal  17 167/73 105 94 % --   01/29/23 2215 -- 56 20 175/76   Abnormal  -- 100 % --   01/29/23 1800 -- 52   Abnormal  20 157/82 112 97 % 2 L/min   01/29/23 1700 -- 52   Abnormal  20 153/76 108 97 % 2 L/min   01/29/23 1445 -- 64 20 151/67 97 95 % 2 L/min   01/29/23 1421 98 1 °F (36 7 °C) -- -- -- -- -- --   01/29/23 1419 -- 68 17 172/68   Abnormal  93 92 % --       Date and Time Eye Opening Best Verbal Response Best Motor Response Alexis Coma Scale Score   01/30/23 0800 4 5 6 15   01/29/23 1430 2 4 5 11       Pertinent Labs/Diagnostic Test Results:     ECG interpreted by me  Date: 1/29/2023  Time: 1427  Rate: 62 bpm   Axis: Normal   Rhythm: Regular  No ST depressions or elevations noted  No QT prolongation  Some scooping of T wave in lead II and V6  Interpretation: Abnormal ECG  MRI brain seizure wo and w contrast   Final    (01/30 0805)      No acute intracranial abnormality identified  Enlarged, empty sella turcica with inferior displacement of pituitary tissue  CT head without contrast   Final    (01/29 1525)      No acute intracranial abnormality  X-ray chest 1 view portable   Final   (01/30 0351)      No focal consolidation, pleural effusion, or pneumothorax                Results from last 7 days   Lab Units 01/29/23  1950   SARS-COV-2  Negative     Results from last 7 days   Lab Units 01/30/23  0551 01/29/23  1428   WBC Thousand/uL 7 13 7 59   HEMOGLOBIN g/dL 11 8 11 4*   HEMATOCRIT % 38 0 36 2   PLATELETS Thousands/uL 210 213   NEUTROS ABS Thousands/µL 3 67 4 19         Results from last 7 days   Lab Units 01/30/23  0551 01/29/23  1428   SODIUM mmol/L 138 136   POTASSIUM mmol/L 4 6 4 7   CHLORIDE mmol/L 104 104   CO2 mmol/L 30 27   ANION GAP mmol/L 4 5   BUN mg/dL 10 13   CREATININE mg/dL 1 09 1 23   EGFR ml/min/1 73sq m 53 46   CALCIUM mg/dL 8 7 8 3     Results from last 7 days   Lab Units 01/30/23  0551 01/29/23  1431 01/29/23  1428   AST U/L 12  --  14   ALT U/L 12  --  13   ALK PHOS U/L 78  --  105   TOTAL PROTEIN g/dL 7 5  --  7 9   ALBUMIN g/dL 3 0*  --  3 0*   TOTAL BILIRUBIN mg/dL 0 32  --  0 27   AMMONIA umol/L 29 47*  --      Results from last 7 days   Lab Units 01/30/23  1607 01/29/23  1423   POC GLUCOSE mg/dl 184* 172*     Results from last 7 days   Lab Units 01/30/23  0551 01/29/23  1428   GLUCOSE RANDOM mg/dL 124 181* Results from last 7 days   Lab Units 01/29/23  1654 01/29/23  1428   HS TNI 0HR ng/L  --  2   HS TNI 2HR ng/L <2  --    HSTNI D2 ng/L <0  --        Results from last 7 days   Lab Units 01/29/23  1950   INFLUENZA A PCR  Negative   INFLUENZA B PCR  Negative   RSV PCR  Negative           ED Treatment:   Medication Administration from 01/29/2023 1405 to 01/30/2023 1901       Date/Time Order Dose Route Action     01/29/2023 1519 EST levETIRAcetam (KEPPRA) 1,500 mg in sodium chloride 0 9 % 100 mL IVPB 1,500 mg Intravenous New Bag     01/29/2023 1542 EST LORazepam (FOR EMS ONLY) (ATIVAN) 2 mg/mL injection 2 mg   Given to EMS     01/29/2023 2132 EST amitriptyline (ELAVIL) tablet 50 mg 50 mg Oral Given     01/29/2023 2132 EST apixaban (ELIQUIS) tablet 5 mg 5 mg Oral Given     01/30/2023 0612 EST pantoprazole (PROTONIX) EC tablet 40 mg 40 mg Oral Given     01/29/2023 2133 EST levETIRAcetam (KEPPRA) 1,000 mg in sodium chloride 0 9 % 100 mL IVPB 1,000 mg Intravenous New Bag     01/29/2023 2242 EST valproate (DEPACON) 250 mg in sodium chloride 0 9 % 50 mL IVPB 250 mg Intravenous New Bag        Past Medical History:   Diagnosis   • Asthma   • Atrial flutter (HCC)   • Bipolar disorder (HCC)   • CRPS (complex regional pain syndrome type I)   • Depression   • Hepatitis C   • Hypertension   • Psychiatric disorder   • Skin disease     Present on Admission:  • Type 2 diabetes mellitus without complication, without long-term current use of insulin (HCC)      Admitting Diagnosis:     Myoclonus [G25 3]  Seizures (HCC) [R56 9]  Seizure-like activity (HCC) [R56 9]    Age/Sex: 57 y o  female    Scheduled Medications:    amitriptyline, 50 mg, Oral, QPM  amLODIPine, 2 5 mg, Oral, Daily  apixaban, 5 mg, Oral, BID  atorvastatin, 10 mg, Oral, Daily  cholecalciferol, 1,000 Units, Oral, Daily  fluticasone, 1 spray, Each Nare, Daily  guaiFENesin, 600 mg, Oral, Q12H [facility]  insulin lispro, 1-5 Units, Subcutaneous, TID AC  levETIRAcetam, 1,000 mg, Intravenous, Q12H [address]  loratadine, 10 mg, Oral, Daily  morphine, 30 mg, Oral, BID  nystatin, , Topical, BID  pantoprazole, 40 mg, Oral, Early Morning  valproate sodium, 250 mg, Intravenous, BID      Continuous IV Infusions:     PRN Meds:  acetaminophen, 650 mg, Oral, Q6H PRN  albuterol, 2 puff, Inhalation, Q4H PRN  benzonatate, 100 mg, Oral, TID PRN  LORazepam, 2 mg, Intravenous, Daily PRN  sodium chloride (PF), 3 mL, Intravenous, Q1H PRN        IP CONSULT TO INTERNAL MEDICINE    Network Utilization Review Department  ATTENTION: Please call with any questions or concerns to [phone] and carefully listen to the prompts so that you are directed to the right person  All voicemails are confidential   Jessi aLzo all requests for admission clinical reviews, approved or denied determinations and any other requests to dedicated fax number below belonging to the campus where the patient is receiving treatment   List of dedicated fax numbers for the Facilities:  [address] DENIALS (Administrative/Medical Necessity) [phone]   [address] (Maternity/NICU/Pediatrics) [phone]   914 Gosia Lowery [phone]   [facility] [address] [phone]   [address] [place] [id number] [place] [address] [facility] [phone]   1556 [facility] 134 815 [facility] [phone]

## 2023-01-30 NOTE — ASSESSMENT & PLAN NOTE
To order viral resp panel is restricted to ID only but regardless given duration of symptoms unlikely acute infection and plus would not  anyway  S/p cxr low suspicion for superimposed bacterial pna as afebrile, no leukocytosis  Will check procal  Pt admits to hx of underlying enviromental allergy thus possibility of post nasal drip  Will start on flonase, claritin  + hx of asthma though no sign of acute exacerbation  + hx of tobacco use though many years ago  Had recent ct chest [facility], 7/2022) w/o mass, lymphadenopathy   + pulm nodules but stable since 2018  If symptoms persist consider ct chest, may also be done as outpt as she is presently receiving 24hr eeg monitoring  Cont symptomatic care

## 2023-01-30 NOTE — CONSULTS
[address] [facility] 1960, 57 y o  female MRN: [id number]  Unit/Bed#: [facility] 722-01 Encounter: [id number]  Primary Care Provider: Jacobo Albardao MD   Date and time admitted to hospital: 1/29/2023  2:05 PM    Inpatient consult to Internal Medicine  Consult performed by: Pili Cota DO  Consult ordered by: Manuel Patino DO          * URI (upper respiratory infection)  Assessment & Plan  To order viral resp panel is restricted to ID only but regardless given duration of symptoms unlikely acute infection and plus would not  anyway  S/p cxr low suspicion for superimposed bacterial pna as afebrile, no leukocytosis  Will check procal  Pt admits to hx of underlying enviromental allergy thus possibility of post nasal drip  Will start on flonase, claritin  + hx of asthma though no sign of acute exacerbation  + hx of tobacco use though many years ago  Had recent ct chest [facility], 7/2022) w/o mass, lymphadenopathy   + pulm nodules but stable since 2018  If symptoms persist consider ct chest, may also be done as outpt as she is presently receiving 24hr eeg monitoring  Cont symptomatic care    Seizure-like activity [facility])  Assessment & Plan  Care per primary team, on EMU  On IV keppra, valproate    Atrial flutter ([address] )  Assessment & Plan  Rate controlled  A/c with eliquis    Essential hypertension  Assessment & Plan  Cont norvasc    Increased ammonia level  Assessment & Plan  Now resolved  LFT's resolved    Chronic pain syndrome  Assessment & Plan  On Morphine sulfate prn; held temporarily per neurology    Asthma  Assessment & Plan  No sign of exacerbation  Cont prn albuterol    Type 2 diabetes mellitus without complication, without long-term current use of insulin (HCC)  Assessment & Plan  Lab Results   Component Value Date    HGBA1C 6 0 (H) 07/15/2022       Recent Labs     01/29/23  1423   POCGLU 172*       Blood Sugar Average: Last 72 hrs:  (P) 172   Chronically diet controlled  VTE Prophylaxis: Eliquis / sequential compression device     Recommendations for Discharge:  · Cont on Flonase, claritin  · Referral to PCP for outpt ct chest    Counseling / Coordination of Care Time: 1 hour  Greater than 50% of total time spent on patient counseling and coordination of care  Collaboration of Care: Were Recommendations Directly Discussed with Primary Treatment Team? - No     History of Present Illness:    Grecia Marina is a 57 y o  female who is originally admitted to the Neurology service due to seizure activity  We are consulted for persistent URI symptoms  Patient is a 33-year-old female with past medical history significant for seizure disorder, asthma, atrial flutter on Eliquis, hypertension, reports of URI symptoms for the past 3 weeks  She reports that her  had same symptoms but his has since resolved for almost 2 weeks  She reports of intermittent cough, productive of yellowish to greenish sputum  She denies fever or chills or night sweats  She also reports of rhinorrhea  She reports only mild intermittent shortness of breath with extreme exertion  She denies any recent foreign travel  Review of Systems:    Review of Systems   HENT: Positive for congestion and rhinorrhea  Respiratory: Positive for cough  Neurological: Positive for seizures  All other systems reviewed and are negative        Past Medical and Surgical History:     Past Medical History:   Diagnosis Date   • Asthma    • Atrial flutter ([facility] [address] )    • Bipolar disorder ([facility] [address] )    • CRPS (complex regional pain syndrome type I)    • Depression    • Hepatitis C    • Hypertension    • Psychiatric disorder    • Skin disease        Past Surgical History:   Procedure Laterality Date   • BILATERAL TEMPOROMANDIBULAR JOINT ARTHROPLASTY  1987   • DENTAL SURGERY  2004   • NECK SURGERY  1997       Meds/Allergies:    all medications and allergies reviewed    Allergies: Allergies   Allergen Reactions   • Clonidine Other (See Comments), Rash and Chest Pain     chest burning    Other reaction(s): Other (See Comments)  chest burning  Other reaction(s): Chest Pain, Other (See Comments)  chest burning   • Erythromycin Hives   • Lidocaine Hives and Anaphylaxis   • Penicillins Anaphylaxis and Other (See Comments)     Anaphylaxis:trever ancef    Other reaction(s): Other (See Comments)  Anaphylaxis:trever ancef  Other reaction(s): Other (See Comments)  Anaphylaxis:trever ancef   • Strawberry Extract - Food Allergy Hives and Anaphylaxis     Actual fruit   • Vancomycin Hives, Rash and Anaphylaxis   • Amantadine Other (See Comments)     severe vomiting   • Clindamycin Other (See Comments)     Other reaction(s): Other (See Comments)   • Clonazepam Other (See Comments) and Hives     Other reaction(s): Unknown Reaction  Other reaction(s): Other (See Comments)   • Duloxetine    • Erythromycin Base Hives   • Gabapentin Other (See Comments)     excruating pain  Other reaction(s): Other (See Comments)  excruating pain  Other reaction(s): Other     • Metronidazole      Leg swelling   • Quetiapine    • Rivaroxaban Itching and Swelling   • Topiramate Other (See Comments)     Other reaction(s):  Other         Social History:     Marital Status: /Civil Union    Substance Use History:   Social History     Substance and Sexual Activity   Alcohol Use Not Currently     Social History     Tobacco Use   Smoking Status Former   Smokeless Tobacco Never     Social History     Substance and Sexual Activity   Drug Use Not Currently       Family History:    Family History   Problem Relation Age of Onset   • No Known Problems Mother    • No Known Problems Father    • Cancer Daughter        Physical Exam:     Vitals:   Blood Pressure: 134/86 (01/30/23 1231)  Pulse: 64 (01/30/23 1231)  Temperature: 97 6 °F (36 4 °C) (01/30/23 0756)  Temp Source: Oral (01/29/23 1421)  Respirations: 18 (01/30/23 0756)  SpO2: 98 % (01/30/23 1231)    Physical Exam  Cardiovascular:      Rate and Rhythm: Normal rate and regular rhythm  Pulses: Normal pulses  Heart sounds: Normal heart sounds  Pulmonary:      Effort: Pulmonary effort is normal  No respiratory distress  Breath sounds: Normal breath sounds  No wheezing or rales  Abdominal:      General: Abdomen is flat  Bowel sounds are normal  There is no distension  Palpations: Abdomen is soft  Tenderness: There is no abdominal tenderness  There is no guarding  Musculoskeletal:         General: Normal range of motion  Cervical back: Normal range of motion and neck supple  Right lower leg: No edema  Left lower leg: No edema  Skin:     General: Skin is warm and dry  Neurological:      General: No focal deficit present  Mental Status: She is alert and oriented to person, place, and time  Mental status is at baseline  Cranial Nerves: No cranial nerve deficit  Motor: No weakness  Additional Data:     Lab Results: I have personally reviewed pertinent reports  Results from last 7 days   Lab Units 01/30/23  0551   WBC Thousand/uL 7 13   HEMOGLOBIN g/dL 11 8   HEMATOCRIT % 38 0   PLATELETS Thousands/uL 210   NEUTROS PCT % 52   LYMPHS PCT % 32   MONOS PCT % 8   EOS PCT % 7*     Results from last 7 days   Lab Units 01/30/23  0551   SODIUM mmol/L 138   POTASSIUM mmol/L 4 6   CHLORIDE mmol/L 104   CO2 mmol/L 30   BUN mg/dL 10   CREATININE mg/dL 1 09   ANION GAP mmol/L 4   CALCIUM mg/dL 8 7   ALBUMIN g/dL 3 0*   TOTAL BILIRUBIN mg/dL 0 32   ALK PHOS U/L 78   ALT U/L 12   AST U/L 12   GLUCOSE RANDOM mg/dL 124             Lab Results   Component Value Date/Time    HGBA1C 6 0 (H) 07/15/2022 04:39 PM    HGBA1C 6 9 (H) 02/03/2022 07:05 AM    HGBA1C 6 7 (H) 01/20/2022 12:36 AM     Results from last 7 days   Lab Units 01/29/23  1423   POC GLUCOSE mg/dl 172*           Imaging: I have personally reviewed pertinent reports  MRI brain seizure wo and w contrast   Final Result by Kelechi Cartwright DO (01/30 0805)      No acute intracranial abnormality identified  Enlarged, empty sella turcica with inferior displacement of pituitary tissue  Workstation performed: [id number]         CT head without contrast   Final Result by Arie Birmingham MD (01/29 1525)      No acute intracranial abnormality  Workstation performed: [id number]         X-ray chest 1 view portable   Final Result by Eduarda Werner MD (01/30 4991)      No focal consolidation, pleural effusion, or pneumothorax  Workstation performed: [id number]             EKG, Pathology, and Other Studies Reviewed on Admission:   · EKG:     ** Please Note: This note has been constructed using a voice recognition system   **

## 2023-01-30 NOTE — QUICK NOTE
Called patient's pain management office with Dr Brunilda Hanson today at [phone]  Requested that the most recent office note be faxed with patient's current pain regimen as the providers had already left for the day  Will update current patient's plan and then will call office tomorrow morning to confirm current pain regimen

## 2023-01-30 NOTE — QUICK NOTE
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 disorder (focal partial seizures of RUE, myoclonus) who recently had her dose of VPA decreased to 250 mg BID (from 500 mg BID) and was initiated on Levetiracetam 500 mg BID presented to [facility] ED on 1/29 for 3 episodes of seizure like activity within 30 minutes from 12:45PM-1:15PM  Episodes described as: b/l upper extremities shaking, eyes open, b/l lower extremities jerking  No urinary/bowel incontinence  No tongue biting noted  Patient herself is able to remember some parts of the episodes, notably her b/l upper extremities shaking  She was noted to be responsive to her name during some of these episodes  Past EEG from 11/12/22 showed L temporal irregular delta activity, but no interictal epileptiform discharges  En route to the ED, she received 2 mg Ativan x1 and was given bolus of Levetiracetam 1500 mg  Initial BP was 172/68, which decreased to 151/67 w/out intervention  Afebrile  Pulse 60s  Labwork significant for VPA level of 67 (106 on 1/15) and elevated Ammonia (47)  [facility] unremarkable  Impression: At this point, it is unclear whether patient's episodes were epileptic seizures given her ability to respond to name, however, there remains suspicion for seizures due to episodes occurring in setting of recently decrease dose of VPA       Plan:   · S/p Ativan 2mg x1, Levetiracetam bolus 1500 mg  · Maintain on Levetiracetam 1g BID,  mg BID  · Pending Levetiracetam level (drawn 1/29 prior to bolus)  · Will reach out to [facility] about vEEG monitoring  · Seizure precautions  · MRI brain w/ and w/out contrast   · Nursing dysphagia eval- to check if patient can tolerate PO meds/diet  · Ativan 2mg PRN for generalized tonic clonic activity > 5 minutes  Even though patient is allergic to clonazepam, she received Ativan en route to the ED w/out any subsequent negative effects as per ED staff and   Increased ammonia level  Assessment & Plan  Lab Results   Component Value Date    AMMONIA 47 (H) 01/29/2023    AMMONIA 41 (H) 01/15/2023       Lab Results   Component Value Date    ALT 13 01/29/2023    AST 14 01/29/2023    ALKPHOS 105 01/29/2023       · Ammonia level noted to be high  · LFTs WNL     Chronic pain syndrome  Assessment & Plan  She has history of chronic pain syndrome and takes morphine at home to control her pain  Plan:  · Given that she is extremely lethargic during interview/exam and relatively pain-free at this point, patient and her  agree to hold off on morphine for now  · Acetaminophen 650mg Q6H PRN  URI (upper respiratory infection)  Assessment & Plan  She reports having syx of a URI approximately 1 5 weeks ago  Has a sore throat now  Cough noted during exam   Afebrile, CBC does not show leukocytosis      Plan:  · Ordered COVID/flu/RSV test  · Will hold off CXR given low concern for pneumonia  · Ordered Mucinex for sinus congestion  · Closely monitor respiratory status    Asthma  Assessment & Plan  · Albuterol Q4H PRN   · Monitor respiratory status    Type 2 diabetes mellitus without complication, without long-term current use of insulin [facility])  Assessment & Plan  Lab Results   Component Value Date    HGBA1C 6 0 (H) 07/15/2022       Recent Labs     01/29/23  1423   POCGLU 172*       Blood Sugar Average: Last 72 hrs:  (P) 172     Plan:   • Hold oral antihyperglycemics  • Diabetic Diet: Consistent CHO Level 2 (5 CHO servings/75g CHO per meal)  • Will order Insulin regimen if patient is able to tolerate oral diet  o Sliding Scale Insulin (insulin naive at baseline)  • Goal Blood Glucose 140-180  • Glucose checks  • Monitor for hypoglycemia and treat per protocol      History:  Tamiko Abreu is a 57 y o  female  with pertinent history of presumed seizure disorder (focal partial seizures of RUE, myoclonus), atrial flutter on apixaban, chronic pain syndrome, DMT2, depression, 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 who presented to [facility] ED on 1/29 for 3 episodes of seizure like activity within 30 minutes from 12:45PM-1:15PM  Episodes described as her  as: b/l upper extremities shaking, eyes open, b/l lower extremities jerking  No urinary/bowel incontinence  No tongue biting noted  Patient herself is able to remember some parts of the episodes, notably her b/l upper extremities shaking  She was noted to be responsive to her name during some of these episodes       Patient was recently seen by Epilepsy team outpatient on 1/25/23 and her AEDs were adjusted: Depakene decreased from 500 mg BID to 250 mg BID and Levetiracetam was introduced: 500 mg BID  She had been complaint with her meds, but her  reports that she was extremely drowsy while taking both AEDs  Past EEG from 11/12/22 showed L temporal irregular delta activity, but no interictal epileptiform discharges       En route to the ED, she received 2 mg Ativan x1 and was given bolus of Levetiracetam 1500 mg  Initial BP was 172/68, which decreased to 151/67 w/out intervention  Afebrile  Pulse 60s  Labwork significant for VPA level of 67 (106 on 1/15) and elevated Ammonia (47)  [address] unremarkable      Patient will be admitted under Neurology primary service under observation  Of note, patient reports having a sore throat and an URI since past 1 5 weeks   Otherwise, currently denies CP, SOB, palpitations, abdominal pain, nausea vomiting, fever, chills, headache, dizziness, new onset numbness/ tingling, new onset weakness, difficulty speaking, difficulty swallowing, new vision changes  Discussed current clinical status and plan with patient and  at bedside- verbalizes understanding  Review of Systems: see HPI  Physical Exam:  /82 (BP Location: Right arm)   Pulse (!) 52   Temp 98 1 °F (36 7 °C) (Oral)   Resp 20   SpO2 97%      Physical exam:  Vitals reviewed  General Examination: No distress, cooperative  CVS: S1, S2 noted  Regular rate, rhythm  Pulm: no respiratory distress  Normal effort  Cough present         Neurological exam:     Mental Status  Lethargic, Ox4  Follows simple, 3 step commands  Able to do basic arithmetic calculations (add quarter, dime, bruno)   Speech: fluent, normal rhythm, no dysarthria  Hoarse voice noted      Cranial Nerves  CN II: Visual fields full to confrontation  CN III, IV, VI: EOMI bilaterally  Normal lids and orbits bilaterally  Pupils equal round and reactive to light bilaterally  No nystagmus  Normal saccades  Normal smooth pursuit    CN V: Facial sensation is normal   CN VII:  Right: There is no facial weakness or asymmetry  Left: There is no facial weakness or asymmetry  CN VIII: Audition grossly intact to voice  CN IX, X: Uvula midline  Palate elevates symmetrically  CN XI:  Right, left SCMs symmetric-able to shrug shoulders bilaterally  CN XII: Tongue midline without atrophy or fasciculations with appropriate movement      Motor  Normal bulk, tone     No fasciculations present    No adventitious movements noted during exam        Right Left   Shoulder abduction 5 5   Shoulder adduction 5 5   Elbow flexion 5 5   Elbow extension 5 5   Finger squeeze strong strong   Hip flexion 5 5   Hip extension 5 5   Knee flexion 5 5   Knee extension 5 5   Plantar flexion 5 5   Dorsiflexion 5 5      Sensory  Upper extremities:  Light touch, proprioception normal   Lower extremities: Light touch, proprioception normal      Reflexes Right Left   Brachioradialis     +2  +2 Biceps                                   +2  +2   Triceps  +2  +2   Patellar   +2  +2      Crossed adductor absent  Ankle clonus absent b/l  Plantars down b/l       Coordination  Finger-to-nose- mild intention tremor noted b/l  Heel to shin normal b/l       Gait  Deferred (patient is extremely lethargic)      Workup:   Lab Results   Component Value Date    WBC 7 59 01/29/2023    HGB 11 4 (L) 01/29/2023    HCT 36 2 01/29/2023    MCV 92 01/29/2023     01/29/2023     Lab Results   Component Value Date    SODIUM 136 01/29/2023    K 4 7 01/29/2023     01/29/2023    CO2 27 01/29/2023    AGAP 5 01/29/2023    BUN 13 01/29/2023    CREATININE 1 23 01/29/2023    GLUC 181 (H) 01/29/2023    CALCIUM 8 3 01/29/2023    AST 14 01/29/2023    ALT 13 01/29/2023    ALKPHOS 105 01/29/2023    TP 7 9 01/29/2023    TBILI 0 27 01/29/2023    EGFR 46 01/29/2023     Lab Results   Component Value Date    VALPROICTOT 67 01/29/2023         CT head without contrast   Final Result by Nkechi Mullins MD (01/29 1525)      No acute intracranial abnormality                    Workstation performed: [id number]         X-ray chest 1 view portable    (Results Pending)   MRI inpatient order    (Results Pending)     Code Status: Level 3  Discussed w/ patient,      VTE Prophylaxis: Apixaban (Eliquis)      GERARD Benjamin , [address] [place] [address] Neurology Residency

## 2023-01-30 NOTE — ASSESSMENT & PLAN NOTE
Lab Results   Component Value Date    HGBA1C 6 0 (H) 07/15/2022       Recent Labs     01/29/23  1423   POCGLU 172*       Blood Sugar Average: Last 72 hrs:  (P) 172       Plan:   • Diet control on d/c

## 2023-01-30 NOTE — ASSESSMENT & PLAN NOTE
57 y o  female  with pertinent history of presumed seizure disorder (focal partial seizures of RUE, myoclonus) who recently had her dose of VPA decreased to 250 mg BID (from 500 mg BID due to elevated Ammonia levels, dizziness, myoclonic jerking and consideration for changes due to pain management wanting to see if medications could be altered) and was initiated on Levetiracetam 500 mg BID presented to [facility] ED on 1/29 for 3 episodes of seizure like activity within 30 minutes from 12:45PM-1:15PM  Semiology described as: b/l upper extremities shaking, eyes open, b/l lower extremities jerking  No urinary/bowel incontinence  No tongue biting noted  Patient herself is able to remember some parts of the episodes, notably her b/l upper extremities shaking  She was noted to be responsive to her name during some of these episodes       Past EEG from 11/12/22 showed L temporal irregular delta activity, but no interictal epileptiform discharges  48 hr vEEG 01/30-01/31: Negative for any seizure events  Keppra level: 19 4    En route to the ED, she received 2 mg Ativan x1 and was given bolus of Levetiracetam 1500 mg  Initial BP was 172/68, which decreased to 151/67 w/out intervention  Afebrile  Pulse 60s  Labwork significant for VPA level of 67 (106 on 1/15) and elevated Ammonia (47)  [address] [facility] unremarkable       Impression: At this point, it is unclear whether patient's episodes were epileptic seizures given her ability to respond to name, however, there remains suspicion for seizures due to episodes occurring in setting of recently decrease dose of VPA  No further events captured on vEEG        Plan:   Continue on Levetiracetam 1g BID on discharge   EEG for 48 hours was negative  • MRI brain w/ and w/out contrast completed and showed Enlarged, empty sella turcica with inferior displacement of pituitary tissue otherwise WNL   • Patient can follow-up with outpatient epilepsy team in 6 to 8 weeks with either Jadyn Scott or   Jennifer Hernandes 15-Jul-2019 01:30
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
## 2023-01-30 NOTE — ASSESSMENT & PLAN NOTE
Lab Results   Component Value Date    AMMONIA 29 01/30/2023    AMMONIA 47 (H) 01/29/2023    AMMONIA 41 (H) 01/15/2023       Lab Results   Component Value Date    ALT 12 01/30/2023    AST 12 01/30/2023    ALKPHOS 78 01/30/2023       · Ammonia level noted to be high  · LFTs WNL

## 2023-01-31 ENCOUNTER — TELEPHONE (OUTPATIENT)
Dept: NEUROLOGY | Facility: CLINIC | Age: 63
End: 2023-01-31

## 2023-01-31 ENCOUNTER — APPOINTMENT (INPATIENT)
Dept: NEUROLOGY | Facility: CLINIC | Age: 63
End: 2023-01-31

## 2023-01-31 DIAGNOSIS — E55.9 VITAMIN D DEFICIENCY: ICD-10-CM

## 2023-01-31 DIAGNOSIS — R56.9 SEIZURE-LIKE ACTIVITY (HCC): Primary | ICD-10-CM

## 2023-01-31 PROBLEM — R79.89 INCREASED AMMONIA LEVEL: Status: RESOLVED | Noted: 2023-01-29 | Resolved: 2023-01-31

## 2023-01-31 LAB
ANION GAP SERPL CALCULATED.3IONS-SCNC: 3 MMOL/L (ref 4–13)
BUN SERPL-MCNC: 9 MG/DL (ref 5–25)
CALCIUM SERPL-MCNC: 8.9 MG/DL (ref 8.3–10.1)
CHLORIDE SERPL-SCNC: 103 MMOL/L (ref 96–108)
CO2 SERPL-SCNC: 30 MMOL/L (ref 21–32)
CREAT SERPL-MCNC: 0.99 MG/DL (ref 0.6–1.3)
GFR SERPL CREATININE-BSD FRML MDRD: 60 ML/MIN/1.73SQ M
GLUCOSE SERPL-MCNC: 116 MG/DL (ref 65–140)
GLUCOSE SERPL-MCNC: 121 MG/DL (ref 65–140)
GLUCOSE SERPL-MCNC: 126 MG/DL (ref 65–140)
GLUCOSE SERPL-MCNC: 127 MG/DL (ref 65–140)
GLUCOSE SERPL-MCNC: 99 MG/DL (ref 65–140)
LEVETIRACETAM SERPL-MCNC: 19.4 UG/ML (ref 10–40)
POTASSIUM SERPL-SCNC: 3.8 MMOL/L (ref 3.5–5.3)
SODIUM SERPL-SCNC: 136 MMOL/L (ref 135–147)

## 2023-01-31 RX ORDER — MORPHINE SULFATE 30 MG/1
30 TABLET, FILM COATED, EXTENDED RELEASE ORAL 2 TIMES DAILY
Status: DISCONTINUED | OUTPATIENT
Start: 2023-01-31 | End: 2023-02-01 | Stop reason: HOSPADM

## 2023-01-31 RX ORDER — FLUTICASONE FUROATE AND VILANTEROL 200; 25 UG/1; UG/1
1 POWDER RESPIRATORY (INHALATION)
Status: DISCONTINUED | OUTPATIENT
Start: 2023-01-31 | End: 2023-02-01 | Stop reason: HOSPADM

## 2023-01-31 RX ORDER — MORPHINE SULFATE 15 MG/1
30 TABLET ORAL EVERY 6 HOURS PRN
Status: DISCONTINUED | OUTPATIENT
Start: 2023-01-31 | End: 2023-02-01 | Stop reason: HOSPADM

## 2023-01-31 RX ORDER — OLANZAPINE 2.5 MG/1
2.5 TABLET ORAL
Status: DISCONTINUED | OUTPATIENT
Start: 2023-01-31 | End: 2023-01-31

## 2023-01-31 RX ORDER — ERGOCALCIFEROL 1.25 MG/1
CAPSULE ORAL
Qty: 12 CAPSULE | Refills: 1 | Status: SHIPPED | OUTPATIENT
Start: 2023-01-31

## 2023-01-31 RX ORDER — HYDROMORPHONE HCL IN WATER/PF 6 MG/30 ML
0.2 PATIENT CONTROLLED ANALGESIA SYRINGE INTRAVENOUS ONCE
Status: COMPLETED | OUTPATIENT
Start: 2023-01-31 | End: 2023-01-31

## 2023-01-31 RX ADMIN — APIXABAN 5 MG: 5 TABLET, FILM COATED ORAL at 18:38

## 2023-01-31 RX ADMIN — MORPHINE SULFATE 30 MG: 30 TABLET, EXTENDED RELEASE ORAL at 12:25

## 2023-01-31 RX ADMIN — ACETAMINOPHEN 975 MG: 325 TABLET ORAL at 16:45

## 2023-01-31 RX ADMIN — NYSTATIN 1 APPLICATION: 100000 POWDER TOPICAL at 18:38

## 2023-01-31 RX ADMIN — CHOLECALCIFEROL TAB 25 MCG (1000 UNIT) 1000 UNITS: 25 TAB at 09:03

## 2023-01-31 RX ADMIN — HYDROMORPHONE HYDROCHLORIDE 2 MG: 2 TABLET ORAL at 21:48

## 2023-01-31 RX ADMIN — AMLODIPINE BESYLATE 2.5 MG: 2.5 TABLET ORAL at 09:03

## 2023-01-31 RX ADMIN — HYDROMORPHONE HYDROCHLORIDE 0.2 MG: 0.2 INJECTION, SOLUTION INTRAMUSCULAR; INTRAVENOUS; SUBCUTANEOUS at 05:58

## 2023-01-31 RX ADMIN — LEVETIRACETAM 1000 MG: 100 INJECTION, SOLUTION INTRAVENOUS at 19:59

## 2023-01-31 RX ADMIN — GUAIFENESIN 600 MG: 600 TABLET, EXTENDED RELEASE ORAL at 20:06

## 2023-01-31 RX ADMIN — TIZANIDINE 4 MG: 4 TABLET ORAL at 21:48

## 2023-01-31 RX ADMIN — MORPHINE SULFATE 30 MG: 15 TABLET ORAL at 09:03

## 2023-01-31 RX ADMIN — LORATADINE 10 MG: 10 TABLET ORAL at 09:03

## 2023-01-31 RX ADMIN — FLUTICASONE PROPIONATE 1 SPRAY: 50 SPRAY, METERED NASAL at 09:07

## 2023-01-31 RX ADMIN — NYSTATIN 1 APPLICATION: 100000 POWDER TOPICAL at 09:20

## 2023-01-31 RX ADMIN — GUAIFENESIN 600 MG: 600 TABLET, EXTENDED RELEASE ORAL at 09:03

## 2023-01-31 RX ADMIN — LEVETIRACETAM 1000 MG: 100 INJECTION, SOLUTION INTRAVENOUS at 09:15

## 2023-01-31 RX ADMIN — FLUTICASONE FUROATE AND VILANTEROL TRIFENATATE 1 PUFF: 200; 25 POWDER RESPIRATORY (INHALATION) at 16:45

## 2023-01-31 RX ADMIN — APIXABAN 5 MG: 5 TABLET, FILM COATED ORAL at 09:03

## 2023-01-31 RX ADMIN — AMITRIPTYLINE HYDROCHLORIDE 50 MG: 50 TABLET, FILM COATED ORAL at 18:38

## 2023-01-31 RX ADMIN — PANTOPRAZOLE SODIUM 40 MG: 40 TABLET, DELAYED RELEASE ORAL at 05:58

## 2023-01-31 RX ADMIN — ACETAMINOPHEN 975 MG: 325 TABLET ORAL at 21:48

## 2023-01-31 RX ADMIN — VALPROATE SODIUM 250 MG: 100 INJECTION, SOLUTION INTRAVENOUS at 09:15

## 2023-01-31 RX ADMIN — Medication 12.5 MG: at 12:25

## 2023-01-31 RX ADMIN — MORPHINE SULFATE 30 MG: 15 TABLET ORAL at 18:38

## 2023-01-31 RX ADMIN — ATORVASTATIN CALCIUM 10 MG: 10 TABLET, FILM COATED ORAL at 16:45

## 2023-01-31 NOTE — TELEPHONE ENCOUNTER
EMU referral in workqueue  Upon chart review, patient is currently admitted due to seizure  Will need to address if EMU still needed once discharged

## 2023-01-31 NOTE — PROGRESS NOTES
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, myoclonus) who recently had her dose of VPA decreased to 250 mg BID (from 500 mg BID due to elevated Ammonia levels, dizziness, myoclonic jerking and consideration for changes due to pain management wanting to see if medications could be altered) and was initiated on Levetiracetam 500 mg BID presented to [facility] ED on 1/29 for 3 episodes of seizure like activity within 30 minutes from 12:45PM-1:15PM  Semiology described as: b/l upper extremities shaking, eyes open, b/l lower extremities jerking  No urinary/bowel incontinence  No tongue biting noted  Patient herself is able to remember some parts of the episodes, notably her b/l upper extremities shaking  She was noted to be responsive to her name during some of these episodes       Past EEG from 11/12/22 showed L temporal irregular delta activity, but no interictal epileptiform discharges    24 hr vEEG 01/30-01/31: Negative for any seizure events    En route to the ED, she received 2 mg Ativan x1 and was given bolus of Levetiracetam 1500 mg  Initial BP was 172/68, which decreased to 151/67 w/out intervention  Afebrile  Pulse 60s  Labwork significant for VPA level of 67 (106 on 1/15) and elevated Ammonia (47)  [facility] unremarkable       Impression:  At this point, it is unclear whether patient's episodes were epileptic seizures given her ability to respond to name, however, there remains suspicion for seizures due to episodes occurring in setting of recently decrease dose of VPA       Plan:   • S/p Ativan 2mg x1, Levetiracetam bolus 1500 mg  • Continue on Levetiracetam 1g BID, will d/c  mg BID (after discussion with the EMU)  ? Pending Levetiracetam level (drawn 1/29 prior to bolus)  • 24 hour vEEG EMU monitoring completed and has been seizure free and no acute events and will d/c later today   • Will monitor clinically while patient is off the VPA  • Seizure precautions  • MRI brain w/ and w/out contrast completed and showed Enlarged, empty sella turcica with inferior displacement of pituitary tissue otherwise WNL   • Ativan 2mg PRN for generalized tonic clonic activity > 5 minutes  Even though patient is allergic to clonazepam, she received Ativan en route to the ED w/out any subsequent negative effects as per ED staff and   • If no further events clinically, will discharge tomorrow in the AM        Chronic pain syndrome  Assessment & Plan  She has history of chronic pain syndrome and takes morphine at home to control her pain  Plan:  · Discussed with Pain Management office  · Confirmed dosage regimen is:   · 30mg Morphine IR prn 4-6 hours   · 30mg Morphine ER BID   · Acetaminophen 650mg Q6H PRN  URI (upper respiratory infection)  Assessment & Plan  She reports having syx of a URI approximately 1 5 - 3 weeks ago  Has a sore throat now  Cough noted during exam   Afebrile, CBC does not show leukocytosis      Plan:  • COVID/flu/RSV negative  • Due to ongoing symptoms of productive cough x 3 weeks, SLIM following  ? Procalcitonin ordered and normal  ? Possibly due to postnasal drip due to underlying a viral allergy  ? Started on Flonase and Claritin  ?  If symptoms persist consider ct chest, may also be done as outpt as she is presently receiving 24hr eeg monitoring  • Ordered Mucinex for sinus congestion  • Closely monitor respiratory status    Asthma  Assessment & Plan  · Albuterol Q4H PRN   · Monitor respiratory status    Type 2 diabetes mellitus without complication, without long-term current use of insulin [facility])  Assessment & Plan  Lab Results   Component Value Date    HGBA1C 6 0 (H) 07/15/2022       Recent Labs     23  1423   POCGLU 172*       Blood Sugar Average: Last 72 hrs:  (P) 172       Plan:   • Hold oral antihyperglycemics  • Diabetic Diet: Consistent CHO Level 2 (5 CHO servings/75g CHO per meal)  ? Patient passed nursing bedside dysphagia screen, will start on sliding scale insulin  • Goal Blood Glucose 140-180  • Glucose checks  • Monitor for hypoglycemia and treat per protocol       Increased ammonia level-resolved as of 2023  Assessment & Plan  Lab Results   Component Value Date    AMMONIA 29 2023    AMMONIA 47 (H) 2023    AMMONIA 41 (H) 01/15/2023       Lab Results   Component Value Date    ALT 12 2023    AST 12 2023    ALKPHOS 78 2023       · Ammonia level noted to be high  · LFTs WNL               SUBJECTIVE     Patient was seen and examined  No acute events overnight  She has no new complaints and she had no further overnight events  She did states she has some back pain but seems to be talking and following commands without much issue  She has a baseline headache but is manageable at this time  She denies any fevers/chill, chest pain, shortness of breath, abdominal pain, nausea, vomiting, diarrhea, problems urinating, problems  With bowel movements, and is eating/drinking without problem      She denies any syncope, paresthesias, diplopia, visual changes, tinnitus, sudden onset weakness, garbled speech, dysarthria/slurring of words,  Loss of bowel or bladder        Review of Systems    See above    OBJECTIVE     Patient ID: Nohemy Beltrán is a 57 y o  female      Vitals:    23 1231 23 1544 23 2210 23 0730   BP: 134/86 (!) 156/108 124/78 170/91   BP Location:       Pulse: 64 74 61 71   Resp:  17  18   Temp:    98 °F (36 7 °C)   TempSrc:       SpO2: 98% 95% 95% 96%      Temperature:   Temp (24hrs), Av °F (36 7 °C), Min:98 °F (36 7 °C), Max:98 °F (36 7 °C)    Temperature: 98 °F (36 7 °C)      Physical Exam     Neurologic Exam     Physical Exam  Vitals and nursing note reviewed  Constitutional:       Appearance: Normal appearance  HENT:      Head: Normocephalic and atraumatic  Right Ear: External ear normal       Left Ear: External ear normal       Nose: Nose normal       Mouth/Throat:      Mouth: Mucous membranes are moist       Pharynx: Oropharynx is clear  Eyes:      Extraocular Movements: Extraocular movements intact and EOM normal       Pupils: Pupils are equal, round, and reactive to light  Cardiovascular:      Rate and Rhythm: Normal rate and regular rhythm  Heart sounds: Normal heart sounds  Pulmonary:      Effort: Pulmonary effort is normal    Abdominal:      General: Abdomen is flat  Palpations: Abdomen is soft  Musculoskeletal:         General: Normal range of motion  Cervical back: Normal range of motion  Skin:     General: Skin is warm and dry  Neurological:      Mental Status: She is alert and oriented to person, place, and time  Psychiatric:         Mood and Affect: Mood normal          Speech: Speech normal          Behavior: Behavior normal             Neurologic Exam      Mental Status   Oriented to person, place, and time  Attention: normal  Concentration: normal    Speech: speech is normal   Level of consciousness: alert     Cranial Nerves      CN II   Visual fields full to confrontation       CN III, IV, VI   Pupils are equal, round, and reactive to light  Extraocular motions are normal    CN III: no CN III palsy  CN VI: no CN VI palsy     CN V   Facial sensation intact       CN VII   Facial expression full, symmetric       CN VIII   Hearing: intact     CN IX, X   Palate: symmetric     CN XI   Right trapezius strength: normal  Left trapezius strength: normal     CN XII   Tongue: not atrophic  Tongue deviation: none      LABORATORY DATA     Labs:  I have personally reviewed pertinent reports  and I have personally reviewed pertinent films in PACS  Results from last 7 days   Lab Units 01/30/23  0551 01/29/23  1428   WBC Thousand/uL 7 13 7 59   HEMOGLOBIN g/dL 11 8 11 4*   HEMATOCRIT % 38 0 36 2   PLATELETS Thousands/uL 210 213   NEUTROS PCT % 52 55   MONOS PCT % 8 7      Results from last 7 days   Lab Units 01/31/23  0503 01/30/23  0551 01/29/23  1428   SODIUM mmol/L 136 138 136   POTASSIUM mmol/L 3 8 4 6 4 7   CHLORIDE mmol/L 103 104 104   CO2 mmol/L 30 30 27   BUN mg/dL 9 10 13   CREATININE mg/dL 0 99 1 09 1 23   CALCIUM mg/dL 8 9 8 7 8 3   ALK PHOS U/L  --  78 105   ALT U/L  --  12 13   AST U/L  --  12 14                            IMAGING & DIAGNOSTIC TESTING     Radiology Results: I have personally reviewed pertinent reports  and I have personally reviewed pertinent films in PACS    MRI brain seizure wo and w contrast   Final Result by Kelechi Abarca DO (01/30 0805)      No acute intracranial abnormality identified  Enlarged, empty sella turcica with inferior displacement of pituitary tissue  Workstation performed: [id number]         CT head without contrast   Final Result by Luis Enrique Mo MD (01/29 1525)      No acute intracranial abnormality  Workstation performed: [id number]         X-ray chest 1 view portable   Final Result by Brandee Greco MD (01/30 0351)      No focal consolidation, pleural effusion, or pneumothorax  Workstation performed: [id number]             Other Diagnostic Testing: I have personally reviewed pertinent reports     and I have personally reviewed pertinent films in PACS    ACTIVE MEDICATIONS     Current Facility-Administered Medications   Medication Dose Route Frequency   • acetaminophen (TYLENOL) tablet 975 mg  975 mg Oral Q8H [address]   • albuterol (PROVENTIL HFA,VENTOLIN HFA) inhaler 2 puff  2 puff Inhalation Q4H PRN   • amitriptyline (ELAVIL) tablet 50 mg  50 mg Oral QPM   • amLODIPine (NORVASC) tablet 2 5 mg  2 5 mg Oral Daily   • apixaban (ELIQUIS) tablet 5 mg  5 mg Oral BID   • atorvastatin (LIPITOR) tablet 10 mg  10 mg Oral Daily   • benzonatate (TESSALON PERLES) capsule 100 mg  100 mg Oral TID PRN   • cholecalciferol (VITAMIN D3) tablet 1,000 Units  1,000 Units Oral Daily   • Diclofenac Sodium (VOLTAREN) 1 % topical gel 2 g  2 g Topical 4x Daily   • fluticasone (FLONASE) 50 mcg/act nasal spray 1 spray  1 spray Each Nare Daily   • guaiFENesin (MUCINEX) 12 hr tablet 600 mg  600 mg Oral Q12H NIKO   • HYDROmorphone (DILAUDID) tablet 2 mg  2 mg Oral Q8H PRN   • insulin lispro (HumaLOG) 100 units/mL subcutaneous injection 1-5 Units  1-5 Units Subcutaneous TID AC   • levETIRAcetam (KEPPRA) 1,000 mg in sodium chloride 0 9 % 100 mL IVPB  1,000 mg Intravenous Q12H [address]   • loratadine (CLARITIN) tablet 10 mg  10 mg Oral Daily   • LORazepam (ATIVAN) injection 2 mg  2 mg Intravenous Daily PRN   • morphine (MS CONTIN) ER tablet 30 mg  30 mg Oral BID   • morphine (MSIR) IR tablet 30 mg  30 mg Oral Q6H PRN   • nystatin (MYCOSTATIN) powder   Topical BID   • pantoprazole (PROTONIX) EC tablet 40 mg  40 mg Oral Early Morning   • sodium chloride (PF) 0 9 % injection 3 mL  3 mL Intravenous Q1H PRN   • tiZANidine (ZANAFLEX) tablet 4 mg  4 mg Oral HS       Prior to Admission medications    Medication Sig Start Date End Date Taking?  Authorizing Provider   albuterol (PROVENTIL HFA,VENTOLIN HFA) 90 mcg/act inhaler Inhale 2 puffs 2 (two) times a day as needed    Historical Provider, MD   amitriptyline (ELAVIL) 50 mg tablet Take 50 mg by mouth every evening 9/3/20   Historical Provider, MD   amLODIPine (NORVASC) 2 5 mg tablet Take 2 5 mg by mouth daily 9/4/20   Historical Provider, MD   atorvastatin (LIPITOR) 10 mg tablet Take 10 mg by mouth daily 1/23/19   Historical Provider, MD   [place] 1 FILM UNDER THE LIP EVERY 12 HOURS  Patient not taking: Reported on 11/14/2022 11/6/20   Historical Provider, MD Cholecalciferol (Vitamin D3) 25 MCG (1000 UT) CAPS Take 1 capsule by mouth daily    Historical Provider, MD   Diclofenac Sodium 1 5 % SOLN Apply 80 mg topically 2 (two) times a day as needed (as needed) 3/11/22   Yosvany Chandler MD   Eliquis 5 MG Take 5 mg by mouth 2 (two) times a day 1/4/23   Historical Provider, MD   ergocalciferol (VITAMIN D2) 50,000 units TAKE 1 CAPSULE BY MOUTH ONCE WEEKLY 1/31/23   Gianni Wright MD   levETIRAcetam (Keppra) 500 mg tablet Take 1 tablet (500 mg total) by mouth 2 (two) times a day 1/13/23   Sofia Dimas DO   menthol-zinc oxide (Calmoseptine) 0 44-20 6 % OINT Apply topically 2 (two) times a day    Historical Provider, MD   miconazole (Miconazorb AF) 2 % powder Apply topically as needed for itching    Historical Provider, MD   morphine (MSIR) 30 MG tablet Take 30 mg by mouth every 4 (four) hours as needed    Historical Provider, MD   OLANZapine (ZyPREXA) 2 5 mg tablet Take 2 5 mg by mouth every evening 9/4/20   Historical Provider, MD   omeprazole (PriLOSEC) 20 mg delayed release capsule Take 20 mg by mouth daily before breakfast 11/3/20   Historical Provider, MD   tiZANidine (ZANAFLEX) 4 mg tablet  2/22/19   Historical Provider, MD   valproic acid (DEPAKENE) 250 mg capsule Take 500 mg by mouth every 12 (twelve) hours 10/21/20   Historical Provider, MD   ergocalciferol (VITAMIN D2) 50,000 units TAKE 1 CAPSULE BY MOUTH ONCE WEEKLY 8/1/22 1/31/23  Yosvany Chandler MD         VTE Pharmacologic Prophylaxis: Apixaban (Eliquis)  VTE Mechanical Prophylaxis: sequential compression device    ==  Pasha Lehmanja 28  Neurology Residency, PGY-2

## 2023-01-31 NOTE — DISCHARGE SUMMARY
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, myoclonus) who recently had her dose of VPA decreased to 250 mg BID (from 500 mg BID due to elevated Ammonia levels, dizziness, myoclonic jerking and consideration for changes due to pain management wanting to see if medications could be altered) and was initiated on Levetiracetam 500 mg BID presented to [facility] ED on 1/29 for 3 episodes of seizure like activity within 30 minutes from 12:45PM-1:15PM  Semiology described as: b/l upper extremities shaking, eyes open, b/l lower extremities jerking  No urinary/bowel incontinence  No tongue biting noted  Patient herself is able to remember some parts of the episodes, notably her b/l upper extremities shaking  She was noted to be responsive to her name during some of these episodes       Past EEG from 11/12/22 showed L temporal irregular delta activity, but no interictal epileptiform discharges  48 hr vEEG 01/30-01/31: Negative for any seizure events  Keppra level: 19 4    En route to the ED, she received 2 mg Ativan x1 and was given bolus of Levetiracetam 1500 mg  Initial BP was 172/68, which decreased to 151/67 w/out intervention  Afebrile  Pulse 60s  Labwork significant for VPA level of 67 (106 on 1/15) and elevated Ammonia (47)  [facility]       Impression:  At this point, it is unclear whether patient's episodes were epileptic seizures given her ability to respond to name, however, there remains suspicion for seizures due to episodes occurring in setting of recently decrease dose of VPA  No further events captured on vEEG     Plan:   Continue on Levetiracetam 1g BID on discharge   EEG for 48 hours was negative  • MRI brain w/ and w/out contrast completed and showed Enlarged, empty sella turcica with inferior displacement of pituitary tissue otherwise WNL   • Patient can follow-up with outpatient epilepsy team in 6 to 8 weeks with either Andrei yRan or Dr Phoebe Wynne      Atrial flutter [facility])  Assessment & Plan  On eliquis and lopressor    Chronic pain syndrome  Assessment & Plan  She has history of chronic pain syndrome and takes morphine at home to control her pain  Plan:  · Discussed with Pain Management office  · Confirmed dosage regimen is:   · 30mg Morphine IR prn 4-6 hours   · 30mg Morphine ER BID   · Acetaminophen 650mg Q6H PRN  URI (upper respiratory infection)  Assessment & Plan  She reports having syx of a URI approximately 1 5 - 3 weeks ago  Has a sore throat now  Cough noted during exam   Afebrile, CBC does not show leukocytosis      Plan:  • COVID/flu/RSV negative  ? Procalcitonin ordered and normal  ? Possibly due to postnasal drip due to underlying a viral allergy  ?  Started on Flonase and Claritin and continue for a few days   • Closely monitor respiratory status    Asthma  Assessment & Plan  · Albuterol Q4H PRN       Type 2 diabetes mellitus without complication, without long-term current use of insulin [facility])  Assessment & Plan  Lab Results   Component Value Date    HGBA1C 6 0 (H) 07/15/2022       Recent Labs     01/29/23  1423   POCGLU 172*       Blood Sugar Average: Last 72 hrs:  (P) 172       Plan:   • Diet control on d/c       Increased ammonia level-resolved as of 1/31/2023  Assessment & Plan  Lab Results   Component Value Date    AMMONIA 29 01/30/2023    AMMONIA 47 (H) 01/29/2023    AMMONIA 41 (H) 01/15/2023       Lab Results   Component Value Date    ALT 12 01/30/2023    AST 12 01/30/2023    ALKPHOS 78 01/30/2023       · Ammonia level noted to be high  · LFTs WNL            Disposition:     Home    Reason for Admission: Worsening of siezure-like episodes following transition of AEDs from [facility] to [facility]  Consultations During Hospital Stay:  · Internal Medicine    Procedures Performed:     · 24 hour vEEG EMU monitoring that was negative   · MRI brain w/ & w/out contrast    Significant Findings / Test Results:     · No significant findings on vEEG or MRI brain    Incidental Findings:   · None    Test Results Pending at Discharge (will require follow up): · None     Outpatient Tests Requested:  · None    Complications:  None    Hospital Course:     Agustin Nyhan is a 57 y o  female with a PMHx of focal partial seizures of RUE, atrial flutter on eliquis, chronic pain syndrome, T2DM, and depression who originally presented to the hospital on 1/29/2023 due to worsening of seizures during an outpatient transition of AEDs  The patient was being transitioned from [address] [place] [address] to [facility]  The patient had three episodes of seizure-like activity in the span of 30 minutes the date of admission, described by her  as b/l upper extremities shaking, eyes open, b/l lower extremities jerking, and able to remember some parts of the episodes  She was admitted for observation under the neurology service, maintained on 1g Keppra BID & 250mg VPA BID, and observed on 24 hour vEEG EMU monitoring  During her stay, she recieved an MRI brain w/ & w/out contrast revealing an enlarged, empty sella turcica with inferior displacement of pituitary tissue, otherwise WNL  She did not have any seizure-like events while being monitored through 48 hour vEEG, and it did not reveal any significant findings   Internal medicine was consulted due to a chronic cough she had x 3 weeks, and they recommended symptomatic treatment using Flonase and Claritin, with consideration of outpatient CT chest if symptoms persist     On the day of discharge, the patient had no new complaints and was in good spirits  She was instructed to follow up with her outpatient neurology team        Condition at Discharge: good     Discharge Day Visit / Exam:     Subjective:  Esequiel Verma was seen and examined at bedside  She has no new complaints and is prepared to be discharged if no new seizure-like events occur  Vitals: Blood Pressure: 149/82 (02/01/23 0734)  Pulse: 69 (02/01/23 0734)  Temperature: 98 4 °F (36 9 °C) (01/31/23 2117)  Temp Source: Oral (01/31/23 2117)  Respirations: 18 (01/31/23 2117)  SpO2: 96 % (02/01/23 0734)    Exam:     Physical Exam  Vitals and nursing note reviewed  Constitutional:       Appearance: Normal appearance  HENT:      Head: Normocephalic and atraumatic  Right Ear: External ear normal       Left Ear: External ear normal       Nose: Nose normal       Mouth/Throat:      Mouth: Mucous membranes are moist       Pharynx: Oropharynx is clear  Eyes:      Extraocular Movements: Extraocular movements intact and EOM normal       Pupils: Pupils are equal, round, and reactive to light  Cardiovascular:      Rate and Rhythm: Normal rate and regular rhythm  Heart sounds: Normal heart sounds  Pulmonary:      Effort: Pulmonary effort is normal    Abdominal:      General: Abdomen is flat  Palpations: Abdomen is soft  Musculoskeletal:         General: Normal range of motion  Cervical back: Normal range of motion  Skin:     General: Skin is warm and dry  Neurological:      Mental Status: She is alert and oriented to person, place, and time  Psychiatric:         Mood and Affect: Mood normal          Speech: Speech normal          Behavior: Behavior normal          Neurologic Exam     Mental Status   Oriented to person, place, and time     Attention: normal  Concentration: normal    Speech: speech is normal   Level of consciousness: alert    Cranial Nerves     CN II   Visual fields full to confrontation  CN III, IV, VI   Pupils are equal, round, and reactive to light  Extraocular motions are normal    CN III: no CN III palsy  CN VI: no CN VI palsy    CN V   Facial sensation intact  CN VII   Facial expression full, symmetric  CN VIII   Hearing: intact    CN IX, X   Palate: symmetric    CN XI   Right trapezius strength: normal  Left trapezius strength: normal    CN XII   Tongue: not atrophic  Tongue deviation: none      Discussion with Family: Patient will discuss with family and declined     Discharge instructions/Information to patient and family:   See after visit summary for information provided to patient and family  Provisions for Follow-Up Care:  See after visit summary for information related to follow-up care and any pertinent home health orders  Planned Readmission: none    Discharge Statement:  I spent 30 minutes discharging the patient  This time was spent on the day of discharge  I had direct contact with the patient on the day of discharge  Greater than 50% of the total time was spent examining patient, answering all patient questions, arranging and discussing plan of care with patient as well as directly providing post-discharge instructions  Additional time then spent on discharge activities  Discharge Medications:  See after visit summary for reconciled discharge medications provided to patient and family        ** Please Note: This note has been constructed using a voice recognition system **      ==  DO Bijal Hahn Neurology Residency, PGY-2

## 2023-01-31 NOTE — CASE MANAGEMENT
Case Management Assessment & Discharge Planning Note    Patient name Naif Fernandez  Location [address] 722/PPHP 260-07 MRN [id number]  : 1960 Date 2023       Current Admission Date: 2023  Current Admission Diagnosis:Seizure-like activity [facility])   Patient Active Problem List    Diagnosis Date Noted   • Essential hypertension 2023   • Atrial flutter ([facility] ) 2023   • Asthma 2023   • URI (upper respiratory infection) 2023   • Chronic pain syndrome 2023   • Neuropathic pain 2022   • Type 2 diabetes mellitus without complication, without long-term current use of insulin ([facility] ) 2022   • [facility] 2020   • Polyneuropathy 2020   • Syncope 2020      LOS (days): 1  Geometric Mean LOS (GMLOS) (days): 2 60  Days to GMLOS:1 6     OBJECTIVE:    Risk of Unplanned Readmission Score: 12 71         Current admission status: Inpatient       Preferred Pharmacy:   [address]  [address]  [place] [address]  Phone: [phone] Fax: [phone]    Primary Care Provider: Paz Thomas MD    Primary Insurance: [address] REP  Secondary Insurance:     ASSESSMENT:  Nora 26 Proxies    There are no active Health Care Proxies on file                   Readmission Root Cause  30 Day Readmission: No    Patient Information  Admitted from[de-identified] Home  Mental Status: Alert  During Assessment patient was accompanied by: Not accompanied during assessment  Assessment information provided by[de-identified] Patient  Primary Caregiver: Self  Support Systems: Self, Spouse/significant other  [place] of Residence: [facility] do you live in?: [place]  Type of Current Residence: Apartment  Floor Level: 1  Upon entering residence, is there a bedroom on the main floor (no further steps)?: Yes  Upon entering residence, is there a bathroom on the main floor (no further steps)?: Yes  In the last 12 months, was there a time when you were not able to pay the mortgage or rent on time?: Yes  In the last 12 months, how many places have you lived?: 1  In the last 12 months, was there a time when you did not have a steady place to sleep or slept in a shelter (including now)?: No  Homeless/housing insecurity resource given?: N/A  Living Arrangements: Lives w/ Spouse/significant other  Is patient a ?: No    Activities of Daily Living Prior to Admission  Functional Status: Independent  Completes ADLs independently?: Yes  Ambulates independently?: Yes  Does patient use assisted devices?: Yes  Assisted Devices (DME) used: Straight Cane  Does patient currently own DME?: Yes  What DME does the patient currently own?: Straight Cane  Does patient have a history of Outpatient Therapy (PT/OT)?: No  Does the patient have a history of Short-Term Rehab?: No  Does patient have a history of HHC?: No  Does patient currently have Boatbound?: No         Patient Information Continued  Income Source: SSI/SSD  Does patient have prescription coverage?: Yes  Within the past 12 months, you worried that your food would run out before you got the money to buy more : Never true  Within the past 12 months, the food you bought just didn't last and you didn't have money to get more : Never true  Food insecurity resource given?: N/A  Does patient receive dialysis treatments?: No  Does patient have a history of substance abuse?: No  Does patient have a history of Mental Health Diagnosis?: No         Means of Transportation  [facility] Family transport  In the past 12 months, has lack of transportation kept you from medical appointments or from getting medications?: No  In the past 12 months, has lack of transportation kept you from meetings, work, or from getting things needed for daily living?: No  Was application for public transport provided?: N/A        DISCHARGE DETAILS:    Discharge planning discussed with[de-identified] bedside with pt  Freedom of Choice: Yes  Comments - Freedom of Choice: Discussed FOC no aftercare reccomendations  CM contacted family/caregiver?: No- see comments (pt is oriented)             Contacts  Patient Contacts: Eliu Briggs  Relationship to Patient[de-identified] Family (spouse)  Contact Method: Phone  Phone Number: [phone]  Reason/Outcome: Emergency [address]         Is the patient interested in [place] [address] at discharge?: No    DME Referral Provided  Referral made for DME?: No

## 2023-02-01 VITALS
DIASTOLIC BLOOD PRESSURE: 82 MMHG | OXYGEN SATURATION: 96 % | TEMPERATURE: 98.4 F | HEART RATE: 69 BPM | RESPIRATION RATE: 18 BRPM | SYSTOLIC BLOOD PRESSURE: 149 MMHG

## 2023-02-01 LAB — GLUCOSE SERPL-MCNC: 115 MG/DL (ref 65–140)

## 2023-02-01 RX ORDER — LEVETIRACETAM 1000 MG/1
1000 TABLET ORAL 2 TIMES DAILY
Qty: 180 TABLET | Refills: 0 | Status: SHIPPED | OUTPATIENT
Start: 2023-02-01

## 2023-02-01 RX ORDER — LORATADINE 10 MG/1
10 TABLET ORAL DAILY
Qty: 10 TABLET | Refills: 0 | Status: SHIPPED | OUTPATIENT
Start: 2023-02-02

## 2023-02-01 RX ORDER — GUAIFENESIN 600 MG/1
600 TABLET, EXTENDED RELEASE ORAL EVERY 12 HOURS SCHEDULED
Qty: 20 TABLET | Refills: 0 | Status: SHIPPED | OUTPATIENT
Start: 2023-02-01 | End: 2023-02-11

## 2023-02-01 RX ORDER — LORATADINE 10 MG/1
10 TABLET ORAL DAILY
Qty: 10 TABLET | Refills: 0 | Status: SHIPPED | OUTPATIENT
Start: 2023-02-02 | End: 2023-02-01 | Stop reason: SDUPTHER

## 2023-02-01 RX ORDER — GUAIFENESIN 600 MG/1
600 TABLET, EXTENDED RELEASE ORAL EVERY 12 HOURS SCHEDULED
Qty: 20 TABLET | Refills: 0 | Status: SHIPPED | OUTPATIENT
Start: 2023-02-01 | End: 2023-02-01 | Stop reason: SDUPTHER

## 2023-02-01 RX ORDER — FLUTICASONE PROPIONATE 50 MCG
1 SPRAY, SUSPENSION (ML) NASAL DAILY
Qty: 9.9 ML | Refills: 0 | Status: SHIPPED | OUTPATIENT
Start: 2023-02-02 | End: 2023-02-12

## 2023-02-01 RX ADMIN — PANTOPRAZOLE SODIUM 40 MG: 40 TABLET, DELAYED RELEASE ORAL at 05:14

## 2023-02-01 RX ADMIN — FLUTICASONE PROPIONATE 1 SPRAY: 50 SPRAY, METERED NASAL at 08:51

## 2023-02-01 RX ADMIN — FLUTICASONE FUROATE AND VILANTEROL TRIFENATATE 1 PUFF: 200; 25 POWDER RESPIRATORY (INHALATION) at 08:57

## 2023-02-01 RX ADMIN — ACETAMINOPHEN 975 MG: 325 TABLET ORAL at 05:14

## 2023-02-01 RX ADMIN — LEVETIRACETAM 1000 MG: 100 INJECTION, SOLUTION INTRAVENOUS at 08:51

## 2023-02-01 RX ADMIN — MORPHINE SULFATE 30 MG: 15 TABLET ORAL at 05:14

## 2023-02-01 RX ADMIN — LORATADINE 10 MG: 10 TABLET ORAL at 08:49

## 2023-02-01 RX ADMIN — Medication 12.5 MG: at 08:48

## 2023-02-01 RX ADMIN — ALBUTEROL SULFATE 2 PUFF: 90 AEROSOL, METERED RESPIRATORY (INHALATION) at 08:51

## 2023-02-01 RX ADMIN — CHOLECALCIFEROL TAB 25 MCG (1000 UNIT) 1000 UNITS: 25 TAB at 08:49

## 2023-02-01 RX ADMIN — GUAIFENESIN 600 MG: 600 TABLET, EXTENDED RELEASE ORAL at 08:48

## 2023-02-01 RX ADMIN — HYDROMORPHONE HYDROCHLORIDE 2 MG: 2 TABLET ORAL at 08:49

## 2023-02-01 RX ADMIN — APIXABAN 5 MG: 5 TABLET, FILM COATED ORAL at 08:49

## 2023-02-01 RX ADMIN — MORPHINE SULFATE 30 MG: 30 TABLET, EXTENDED RELEASE ORAL at 08:49

## 2023-02-01 NOTE — UTILIZATION REVIEW
NOTIFICATION OF ADMISSION DISCHARGE   This is a Notification of Discharge from [address]  Please be advised that this patient has been discharge from our facility  Below you will find the admission and discharge date and time including the patient’s disposition  UTILIZATION REVIEW CONTACT:  Yanick Kessler  Utilization   Network Utilization Review Department  Phone: [phone] x carefully listen to the prompts  All voicemails are confidential   Email: [email or web address]     ADMISSION INFORMATION  PRESENTATION DATE: 1/29/2023  2:05 PM  OBERVATION ADMISSION DATE:   INPATIENT ADMISSION DATE: 1/30/23 12:00 PM   DISCHARGE DATE: 2/1/2023 10:04 AM   DISPOSITION:Home/Self Care    IMPORTANT INFORMATION:  Send all requests for admission clinical reviews, approved or denied determinations and any other requests to dedicated fax number below belonging to the campus where the patient is receiving treatment   List of dedicated fax numbers:  [address] DENIALS (Administrative/Medical Necessity) [phone]   [address] (Maternity/NICU/Pediatrics) [phone]   [facility] [phone]   [place] [address] [phone]   [address] [phone]   220 [facility] [phone]   90 [facility] [phone]   [address] [place], [place] [address] [address] [phone]   [facility]  [phone]   [facility] [phone]   412 [facility] [address] [facility] [phone]

## 2023-02-01 NOTE — PLAN OF CARE
Problem: PAIN - ADULT  Goal: Verbalizes/displays adequate comfort level or baseline comfort level  Description: Interventions:  - Encourage patient to monitor pain and request assistance  - Assess pain using appropriate pain scale  - Administer analgesics based on type and severity of pain and evaluate response  - Implement non-pharmacological measures as appropriate and evaluate response  - Consider cultural and social influences on pain and pain management  - Notify physician/advanced practitioner if interventions unsuccessful or patient reports new pain  Outcome: Progressing     Problem: INFECTION - ADULT  Goal: Absence or prevention of progression during hospitalization  Description: INTERVENTIONS:  - Assess and monitor for signs and symptoms of infection  - Monitor lab/diagnostic results  - Monitor all insertion sites, i e  indwelling lines, tubes, and drains  - Monitor endotracheal if appropriate and nasal secretions for changes in amount and color  - Prosperity appropriate cooling/warming therapies per order  - Administer medications as ordered  - Instruct and encourage patient and family to use good hand hygiene technique  - Identify and instruct in appropriate isolation precautions for identified infection/condition  Outcome: Progressing  Goal: Absence of fever/infection during neutropenic period  Description: INTERVENTIONS:  - Monitor WBC    Outcome: Progressing     Problem: SAFETY ADULT  Goal: Patient will remain free of falls  Description: INTERVENTIONS:  - Educate patient/family on patient safety including physical limitations  - Instruct patient to call for assistance with activity   - Consult OT/PT to assist with strengthening/mobility   - Keep Call bell within reach  - Keep bed low and locked with side rails adjusted as appropriate  - Keep care items and personal belongings within reach  - Initiate and maintain comfort rounds  - Make Fall Risk Sign visible to staff  - Offer Toileting every 2 Hours, in advance of need  - Apply yellow socks and bracelet for high fall risk patients  - Consider moving patient to room near nurses station  Outcome: Progressing  Goal: Maintain or return to baseline ADL function  Description: INTERVENTIONS:  -  Assess patient's ability to carry out ADLs; assess patient's baseline for ADL function and identify physical deficits which impact ability to perform ADLs (bathing, care of mouth/teeth, toileting, grooming, dressing, etc )  - Assess/evaluate cause of self-care deficits   - Assess range of motion  - Assess patient's mobility; develop plan if impaired  - Assess patient's need for assistive devices and provide as appropriate  - Encourage maximum independence but intervene and supervise when necessary  - Involve family in performance of ADLs  - Assess for home care needs following discharge   - Consider OT consult to assist with ADL evaluation and planning for discharge  - Provide patient education as appropriate  Outcome: Progressing  Goal: Maintains/Returns to pre admission functional level  Description: INTERVENTIONS:  - Perform BMAT or MOVE assessment daily    - Set and communicate daily mobility goal to care team and patient/family/caregiver  - Collaborate with rehabilitation services on mobility goals if consulted  - Perform Range of Motion 2 times a day  - Reposition patient every 2 hours    - Dangle patient 2 times a day  - Stand patient 2 times a day  - Ambulate patient 2 times a day  - Out of bed to chair 2 times a day   - Out of bed for meals 2 times a day  - Out of bed for toileting  - Record patient progress and toleration of activity level   Outcome: Progressing     Problem: DISCHARGE PLANNING  Goal: Discharge to home or other facility with appropriate resources  Description: INTERVENTIONS:  - Identify barriers to discharge w/patient and caregiver  - Arrange for needed discharge resources and transportation as appropriate  - Identify discharge learning needs (meds, wound care, etc )  - Arrange for interpretive services to assist at discharge as needed  - Refer to Case Management Department for coordinating discharge planning if the patient needs post-hospital services based on physician/advanced practitioner order or complex needs related to functional status, cognitive ability, or social support system  Outcome: Progressing     Problem: Knowledge Deficit  Goal: Patient/family/caregiver demonstrates understanding of disease process, treatment plan, medications, and discharge instructions  Description: Complete learning assessment and assess knowledge base  Interventions:  - Provide teaching at level of understanding  - Provide teaching via preferred learning methods  Outcome: Progressing     Problem: MOBILITY - ADULT  Goal: Maintain or return to baseline ADL function  Description: INTERVENTIONS:  -  Assess patient's ability to carry out ADLs; assess patient's baseline for ADL function and identify physical deficits which impact ability to perform ADLs (bathing, care of mouth/teeth, toileting, grooming, dressing, etc )  - Assess/evaluate cause of self-care deficits   - Assess range of motion  - Assess patient's mobility; develop plan if impaired  - Assess patient's need for assistive devices and provide as appropriate  - Encourage maximum independence but intervene and supervise when necessary  - Involve family in performance of ADLs  - Assess for home care needs following discharge   - Consider OT consult to assist with ADL evaluation and planning for discharge  - Provide patient education as appropriate  Outcome: Progressing  Goal: Maintains/Returns to pre admission functional level  Description: INTERVENTIONS:  - Perform BMAT or MOVE assessment daily    - Set and communicate daily mobility goal to care team and patient/family/caregiver  - Collaborate with rehabilitation services on mobility goals if consulted  - Perform Range of Motion 2 times a day    - Reposition patient every 2 hours    - Dangle patient 2 times a day  - Stand patient 2 times a day  - Ambulate patient 2 times a day  - Out of bed to chair 2 times a day   - Out of bed for meals 2 times a day  - Out of bed for toileting  - Record patient progress and toleration of activity level   Outcome: Progressing

## 2023-02-01 NOTE — RESTORATIVE TECHNICIAN NOTE
Restorative Technician Note      Patient Name: Linette Ledesma     Note Type: Mobility  Patient Position Upon Consult: Supine  Activity Performed: Ambulated; Dangled; Stood  Assistive Device: Other (Comment) (none)  Education Provided: Yes  Patient Position at End of Consult: Supine;  All needs within reach; Bed/Chair alarm activated    Daniel IGLESIAS, Restorative Technician, [employer]

## 2023-02-01 NOTE — PROGRESS NOTES
[address] [facility]  Progress Note Joel Santamaria 1960, 57 y o  female MRN: [id number]  Unit/Bed#: [facility] 722-01 Encounter: [id number]  Primary Care Provider: Jacobo Albarado MD   Date and time admitted to hospital: 2023  2:05 PM    * Seizure-like activity [facility])  Assessment & Plan  · Care per primary team, on EMU  · On IV keppra,   · Valproate discontinued    Type 2 diabetes mellitus without complication, without long-term current use of insulin [facility])  Assessment & Plan  Lab Results   Component Value Date    HGBA1C 6 0 (H) 07/15/2022       Recent Labs     23  2107 23  0612 23  1101 23  1649   POCGLU 181* 116 126 121       Blood Sugar Average: Last 72 hrs:  (P) 150   Chronically diet controlled  Asthma  Assessment & Plan  No sign of exacerbation  Cont prn albuterol    URI (upper respiratory infection)  Assessment & Plan  · Symptomatic treatment    Chronic pain syndrome  Assessment & Plan  · Continue home regimen    Essential hypertension  Assessment & Plan  · Home medication metoprolol tartrate 12 5 mg twice daily - begun today  · Monitor BP    Atrial flutter (HCC)  Assessment & Plan  · Continue metoprolol tartrate  · Anticoagulation with Eliquis      VTE Pharmacologic Prophylaxis: VTE Score: 3 Moderate Risk (Score 3-4) - Pharmacological DVT Prophylaxis Ordered: apixaban (Eliquis)  Discussions with Specialists or Other Care Team Provider: Discussed with neurology    Education and Discussions with Family / Patient: Updated  () at bedside  Time Spent for Care: 20 minutes  More than 50% of total time spent on counseling and coordination of care as described above      Current Length of Stay: 1 day(s)  Current Patient Status: Inpatient     Code Status: Level 3 - DNAR and DNI    Subjective:   No recurrence of seizure-like activity    Objective:     Vitals:   Temp (24hrs), Av 2 °F (36 8 °C), Min:98 °F (36 7 °C), Max:98 4 °F (36 9 °C)    Temp:  [98 °F (36 7 °C)-98 4 °F (36 9 °C)] 98 4 °F (36 9 °C)  HR:  [61-78] 66  Resp:  [18] 18  BP: (124-170)/(78-92) 159/80  SpO2:  [95 %-97 %] 96 %     Physical Exam:   Physical Exam  Vitals reviewed  HENT:      Head: Normocephalic  Nose: Nose normal       Mouth/Throat:      Mouth: Mucous membranes are moist    Eyes:      Extraocular Movements: Extraocular movements intact  Cardiovascular:      Rate and Rhythm: Regular rhythm  Pulmonary:      Effort: Pulmonary effort is normal  No respiratory distress  Breath sounds: Normal breath sounds  No wheezing  Abdominal:      General: Bowel sounds are normal  There is no distension  Palpations: Abdomen is soft  Musculoskeletal:         General: No swelling  Cervical back: Neck supple  Skin:     General: Skin is warm  Neurological:      General: No focal deficit present  Mental Status: She is alert and oriented to person, place, and time     Psychiatric:         Mood and Affect: Mood normal           Additional Data:     Labs:  Results from last 7 days   Lab Units 01/30/23  0551   WBC Thousand/uL 7 13   HEMOGLOBIN g/dL 11 8   HEMATOCRIT % 38 0   PLATELETS Thousands/uL 210   NEUTROS PCT % 52   LYMPHS PCT % 32   MONOS PCT % 8   EOS PCT % 7*     Results from last 7 days   Lab Units 01/31/23  0503 01/30/23  0551   SODIUM mmol/L 136 138   POTASSIUM mmol/L 3 8 4 6   CHLORIDE mmol/L 103 104   CO2 mmol/L 30 30   BUN mg/dL 9 10   CREATININE mg/dL 0 99 1 09   ANION GAP mmol/L 3* 4   CALCIUM mg/dL 8 9 8 7   ALBUMIN g/dL  --  3 0*   TOTAL BILIRUBIN mg/dL  --  0 32   ALK PHOS U/L  --  78   ALT U/L  --  12   AST U/L  --  12   GLUCOSE RANDOM mg/dL 127 124         Results from last 7 days   Lab Units 01/31/23  1649 01/31/23  1101 01/31/23  0612 01/30/23  2107 01/30/23  1607 01/29/23  1423   POC GLUCOSE mg/dl 121 126 116 181* 184* 172*         Results from last 7 days   Lab Units 01/30/23  1616   PROCALCITONIN ng/ml <0 05 Lines/Drains:  Invasive Devices     Peripheral Intravenous Line  Duration           Peripheral IV 01/30/23 Right Antecubital 1 day                Last 24 Hours Medication List:   Current Facility-Administered Medications   Medication Dose Route Frequency Provider Last Rate   • acetaminophen  975 mg Oral Q8H [address], DO     • albuterol  2 puff Inhalation Q4H PRN Joanie Membreno MD     • amitriptyline  50 mg Oral QPM Joanie Membreno MD     • apixaban  5 mg Oral BID Joanie Membreno MD     • atorvastatin  10 mg Oral Daily Joanie Membreno MD     • benzonatate  100 mg Oral TID PRN Wendy Fleming, DO     • cholecalciferol  1,000 Units Oral Daily Joanie Membreno MD     • Diclofenac Sodium  2 g Topical 4x Daily Pasha Arredondo DO     • fluticasone  1 spray Each Nare Daily Kody Lee DO     • fluticasone-vilanterol  1 puff Inhalation Daily Audra Gama MD     • guaiFENesin  600 mg Oral Q12H Tracee Mazariegos MD     • HYDROmorphone  2 mg Oral Q8H PRN Brisa Spears DO     • insulin lispro  1-5 Units Subcutaneous TID AC Pasha Spears DO     • levETIRAcetam  1,000 mg Intravenous Q12H Tracee Mazariegos MD 1,000 mg (01/31/23 1959)   • loratadine  10 mg Oral Daily Kody Lee DO     • LORazepam  2 mg Intravenous Daily PRN Joanie Membreno MD     • metoprolol tartrate  12 5 mg Oral Q12H Harshal Gamez MD     • morphine  30 mg Oral BID Wendy Fleming, DO     • morphine  30 mg Oral Q6H PRN Brisa Spears DO     • nystatin   Topical BID Pasha Spears DO     • pantoprazole  40 mg Oral Early Morning Joanie Membreno MD     • sodium chloride (PF)  3 mL Intravenous Q1H PRN Joanie Membreno MD     • tiZANidine  4 mg Oral HS Wendy Fleming DO          Today, Patient Was Seen By: Audra Gama MD    **Please Note: This note may have been constructed using a voice recognition system  **

## 2023-02-01 NOTE — ASSESSMENT & PLAN NOTE
Lab Results   Component Value Date    HGBA1C 6 0 (H) 07/15/2022       Recent Labs     01/30/23  2107 01/31/23  0612 01/31/23  1101 01/31/23  1649   POCGLU 181* 116 126 121       Blood Sugar Average: Last 72 hrs:  (P) 150   Chronically diet controlled

## 2023-02-01 NOTE — DISCHARGE INSTR - AVS FIRST PAGE
Please continue on higher dose of Keppra and follow-up with outpatient neurology team  Please come back to the ED if your symptoms severely worsen, you experience chest pain, or shortness of breath  Please follow-up with your PCP in 1 week after discharge

## 2023-02-02 NOTE — ASSESSMENT & PLAN NOTE
Lab Results   Component Value Date    HGBA1C 6 0 (H) 07/15/2022       Recent Labs     01/31/23  1101 01/31/23  1649 01/31/23  2115 02/01/23  0624   POCGLU 126 121 99 115       Blood Sugar Average: Last 72 hrs:  (P) 139 25   Chronically diet controlled

## 2023-02-02 NOTE — PROGRESS NOTES
[facility] [facility]  Progress Note [facility]a [facility] 1960, 57 y o  female MRN: [id number]  Unit/Bed#: [facility] 722-01 Encounter: [id number]  Primary Care Provider: Geoff Richey MD   Date and time admitted to hospital: 1/29/2023  2:05 PM    * Seizure-like activity [facility])  Assessment & Plan  · Care per primary team    Type 2 diabetes mellitus without complication, without long-term current use of insulin [facility])  Assessment & Plan  Lab Results   Component Value Date    HGBA1C 6 0 (H) 07/15/2022       Recent Labs     01/31/23  1101 01/31/23  1649 01/31/23  2115 02/01/23  0624   POCGLU 126 121 99 115       Blood Sugar Average: Last 72 hrs:  (P) 139 25   Chronically diet controlled  Asthma  Assessment & Plan  No sign of exacerbation  Cont prn albuterol    URI (upper respiratory infection)  Assessment & Plan  · Symptomatic treatment    Chronic pain syndrome  Assessment & Plan  · Continue home regimen    Essential hypertension  Assessment & Plan  · Ct home medication metoprolol tartrate 12 5 mg twice daily   · Monitor BP    Atrial flutter (HCC)  Assessment & Plan  · Continue metoprolol tartrate  · Anticoagulation with Eliquis      VTE Pharmacologic Prophylaxis: VTE Score: 3 Moderate Risk (Score 3-4) - Pharmacological DVT Prophylaxis Ordered: apixaban (Eliquis)  Education and Discussions with Family / Patient: Updated  () at bedside  Time Spent for Care: 20 minutes  More than 50% of total time spent on counseling and coordination of care as described above  Current Length of Stay: 2 day(s)  Current Patient Status: Inpatient     Subjective:   Feels well  No seizures  Objective:   HR:  [69] 69  BP: (149)/(82) 149/82  SpO2:  [96 %] 96 %     Physical Exam:   Physical Exam  Vitals reviewed  HENT:      Head: Normocephalic        Nose: Nose normal       Mouth/Throat:      Mouth: Mucous membranes are moist    Eyes:      Extraocular Movements: Extraocular movements intact  Cardiovascular:      Rate and Rhythm: Normal rate and regular rhythm  Pulmonary:      Effort: Pulmonary effort is normal  No respiratory distress  Breath sounds: Normal breath sounds  No wheezing  Abdominal:      General: Bowel sounds are normal  There is no distension  Palpations: Abdomen is soft  Tenderness: There is no abdominal tenderness  Musculoskeletal:         General: No swelling  Cervical back: Neck supple  Skin:     General: Skin is warm and dry  Neurological:      General: No focal deficit present  Mental Status: She is alert  Psychiatric:         Mood and Affect: Mood normal          Behavior: Behavior normal           Additional Data:     Labs:  Results from last 7 days   Lab Units 01/30/23  0551   WBC Thousand/uL 7 13   HEMOGLOBIN g/dL 11 8   HEMATOCRIT % 38 0   PLATELETS Thousands/uL 210   NEUTROS PCT % 52   LYMPHS PCT % 32   MONOS PCT % 8   EOS PCT % 7*     Results from last 7 days   Lab Units 01/31/23  0503 01/30/23  0551   SODIUM mmol/L 136 138   POTASSIUM mmol/L 3 8 4 6   CHLORIDE mmol/L 103 104   CO2 mmol/L 30 30   BUN mg/dL 9 10   CREATININE mg/dL 0 99 1 09   ANION GAP mmol/L 3* 4   CALCIUM mg/dL 8 9 8 7   ALBUMIN g/dL  --  3 0*   TOTAL BILIRUBIN mg/dL  --  0 32   ALK PHOS U/L  --  78   ALT U/L  --  12   AST U/L  --  12   GLUCOSE RANDOM mg/dL 127 124         Results from last 7 days   Lab Units 02/01/23  0624 01/31/23  2115 01/31/23  1649 01/31/23  1101 01/31/23  0612 01/30/23  2107 01/30/23  1607 01/29/23  1423   POC GLUCOSE mg/dl 115 99 121 126 116 181* 184* 172*         Results from last 7 days   Lab Units 01/30/23  1616   PROCALCITONIN ng/ml <0 05       Lines/Drains:  Invasive Devices     None                Today, Patient Was Seen By: Arleth Beal MD    **Please Note: This note may have been constructed using a voice recognition system  **

## 2023-02-05 NOTE — ASSESSMENT & PLAN NOTE
Patient with recurrent episodes of seizure like activity, recently transitioning from Depakote to levetiracetam  She is currently on Depakote 250-500 and levetiracetam 500 mg BID  Events started back in 2009, recently worsening involving extremity jerking  There was a recent episode of nocturnal incontinence  There may be possible tonic clonic seizures provoked by light flashing through the window in the car  There was a recent fall where she woke up on the floor and did not know what had happened  Given semiology of events, it is unclear if these episodes represent epileptic seizure vs another etiology including pain response or psychogenic non-epileptic events  No clear risk factors for seizures though does have a son who had a single febrile seizure  Prior MRI brain in 2019 without acute abnormality  Routine EEG in 2019 was normal  There was a recent EEG in December 2022 that revealed focal left temporal irregular delta activity  While the EEG is abnormal, there is no clear seizure tendency but given this finding, recommended MRI brain with and without contrast with seizure protocol to rule out underlying structural abnormality  She has been feeling tired on combination of Depakote and levetiracetam  Recommended decreasing Depakote to 250 mg BID given prior elevated level and high ammonia level  Continue with levetiracetam 500 mg BID and have blood work in about one week prior to taking morning medications  Can increase levetiracetam further if needed  Recommended patient call the office with further events or concerns  Plan for epilepsy monitoring unit admission to capture and characterize events to assist in guiding further treatment  Follow up in the office in 8 weeks

## 2023-02-06 NOTE — TELEPHONE ENCOUNTER
Abby- patient recently admitted due to seizure activity and had veeg performed  Do you need EMU admission?

## 2023-02-07 NOTE — TELEPHONE ENCOUNTER
Pt left VM returning our call  Called back and spoke with pt  EMU admission scheduled for 3/13/23 at 09:00 with Dr Russella Fleischer attending  Pt added to [place] calendar  ADT21 order placed  EMU education sheet reviewed with pt  and mailed to her home  Telephone encounter forwarded to [employer] and ordering provider

## 2023-02-10 NOTE — TELEPHONE ENCOUNTER
This was already addressed  EEG was normal and no events were captured  I recommend that she continue with EMU admission to we can capture and characterize events  She does not need to be seen in the office prior to her EMU admission

## 2023-02-10 NOTE — TELEPHONE ENCOUNTER
Hello, this is [employer]  Kitty Olmstead was in the hospital, january 29th -  February 1st, and at that time they had her do an EEG  Now you want to schedule another EMU in-patient for her  Didn't the EEG satisfy what you needed to see? We're not clear on that  Also she's wondering if is she supposed to see anybody before her march 17th appointment regarding her just being in the hospital  They put her on Keppra  She's having a lot of tics, a lot of spasms going on  Very tired  Were just curious as to what's going on here  If you could please call me back at [phone]  Thank you   _________________________________    Julius Chan pt's  Patito Cheek (on consent form)  Received vm  Left detailed msg (per consent) regarding Abby's note below  Advised  we would get back to him regarding question of patient needing to be seen prior to [facility] admission  Abby Vaz pt has f/u appt 3/17/23  EMU admission scheduled for 3/13/23  Any need for patient to be seen prior to EMU admission?

## 2023-03-08 ENCOUNTER — TELEPHONE (OUTPATIENT)
Dept: NEUROLOGY | Facility: CLINIC | Age: 63
End: 2023-03-08

## 2023-03-08 NOTE — TELEPHONE ENCOUNTER
When I called patient I was unable to leave a message the phone became static noises  And I was not able to hear

## 2023-03-12 ENCOUNTER — TELEPHONE (OUTPATIENT)
Dept: OTHER | Facility: OTHER | Age: 63
End: 2023-03-12

## 2023-03-12 NOTE — TELEPHONE ENCOUNTER
Pt's  called to inform that pt will not be able to make it to upcoming procedure/ study 3/23/203,at [address] was paged and informed

## 2023-03-14 NOTE — TELEPHONE ENCOUNTER
Message left with  tSoney Cheng asking if patient would like to reschedule EMU admission and if any concerns we could address regarding admission

## 2023-03-24 DIAGNOSIS — J06.9 URI (UPPER RESPIRATORY INFECTION): ICD-10-CM

## 2023-05-11 ENCOUNTER — TELEPHONE (OUTPATIENT)
Dept: NEUROLOGY | Facility: CLINIC | Age: 63
End: 2023-05-11

## 2023-05-11 NOTE — TELEPHONE ENCOUNTER
I called and left a voicemail message reminding the patient of there upcoming appointment with Ramiro Jones on 5/24/23 @ 1:30 pm  I requested a call back to our office to confirm the appointment or if the patient has any issues or concerns or cannot keep this appointment

## 2023-06-22 RX ORDER — FLUTICASONE PROPIONATE 50 MCG
SPRAY, SUSPENSION (ML) NASAL
Qty: 16 ML | Refills: 0 | OUTPATIENT
Start: 2023-06-22

## 2023-07-15 DIAGNOSIS — E55.9 VITAMIN D DEFICIENCY: ICD-10-CM

## 2023-07-15 RX ORDER — ERGOCALCIFEROL 1.25 MG/1
CAPSULE ORAL
Qty: 12 CAPSULE | Refills: 1 | Status: SHIPPED | OUTPATIENT
Start: 2023-07-15

## 2024-06-24 ENCOUNTER — TELEPHONE (OUTPATIENT)
Dept: INTERNAL MEDICINE CLINIC | Facility: OTHER | Age: 64
End: 2024-06-24

## 2024-06-24 NOTE — TELEPHONE ENCOUNTER
Patient has established with [facility]. Please remove our PCP from patients chart.

## 2024-06-25 NOTE — TELEPHONE ENCOUNTER
06/25/24 10:30 AM        The office's request has been received, reviewed, and the patient chart updated. The PCP has successfully been removed with a patient attribution note. This message will now be completed.        Thank you  Christy Verdugo

## 2025-01-23 ENCOUNTER — TELEPHONE (OUTPATIENT)
Age: 65
End: 2025-01-23

## 2025-01-23 DIAGNOSIS — R56.9 SEIZURE-LIKE ACTIVITY (HCC): ICD-10-CM

## 2025-01-23 RX ORDER — LEVETIRACETAM 1000 MG/1
1000 TABLET ORAL 2 TIMES DAILY
Qty: 180 TABLET | Refills: 0 | Status: CANCELLED | OUTPATIENT
Start: 2025-01-23

## 2025-01-23 NOTE — TELEPHONE ENCOUNTER
01/23/25    .Medication: LevETIRAcetam (Keppra) 1000 MG tablet     Dose/Frequency: NEEDS REVIEW     Quantity: Dispense: 180 tablet     Pharmacy: [id number]  [place], [place] - [address] [place] [address]     Office:   [x] PCP/Provider -   [] Speciality/Provider -     Does the patient have enough for 3 days?   [] Yes   [x] No - Send as HP to POD        NOTE:   Patient aware it would be the discretion of the provider if med would be filled due that she hasn't been seen for 2 years.     Patient UNDERSTOOD.      Patient scheduled for 06/04/25, 8:30 AM with Dr. Capellan at the [place] Location.  Appt Placed on the Waiting list.

## 2025-01-23 NOTE — TELEPHONE ENCOUNTER
Scheduled patient OVL with Meera in [place] office 3/13 @ 930 am.    Patient stated she has no Keppra medication until then.  Will have  call 911 if she has a seizure.

## 2025-01-23 NOTE — TELEPHONE ENCOUNTER
01/23/25    Patient called office today to schedule an overdue f/u appt.    Patient was very much aware that she hasn't been seen about 2 years and needs a f/u.    Patient Expressed her Apologies to Dr. Capellan for not staying diligent with her f/u care but wants Dr. Capellan to know that she haves being going through personal and health problems (CHRONIC PANCREATITIS) that's gotten in the way of her day-to-day life.    Patient is reaching out today due that she stated that her Muscle Twitching are starting up again, and it happened while she was sleeping, and she never noticed until her  woke her up and told her.     Patient stated that the Muscle twitching started the past couple of weeks.     I asked Patient if it that was a Normal Symptoms with her Seizures, and she stated Yes.      Patient scheduled for 06/04/25, 8:30 AM with Dr. Capellan at the [place] [facility].  Appt Placed on the Waiting list.      However, if possible and appropriate, is there anyone else in the Epilepsy Team that Timi Clay can advice for patient to be seen with and sooner.     Please Review and Advice.  Thank You.       NOTE:   Med refill request also sent for KEPPRA.   Patient aware it would be the discretion of the provider if med would be filled due that she hasn't been seen for 2 years.     Patient UNDERSTOOD.

## 2025-01-24 ENCOUNTER — OFFICE VISIT (OUTPATIENT)
Dept: NEUROLOGY | Facility: CLINIC | Age: 65
End: 2025-01-24
Payer: COMMERCIAL

## 2025-01-24 VITALS
BODY MASS INDEX: 28.52 KG/M2 | TEMPERATURE: 97.7 F | OXYGEN SATURATION: 95 % | DIASTOLIC BLOOD PRESSURE: 84 MMHG | HEART RATE: 91 BPM | RESPIRATION RATE: 18 BRPM | WEIGHT: 171.2 LBS | HEIGHT: 65 IN | SYSTOLIC BLOOD PRESSURE: 134 MMHG

## 2025-01-24 DIAGNOSIS — G25.3 MYOCLONIC JERKING: ICD-10-CM

## 2025-01-24 DIAGNOSIS — R56.9 SEIZURE-LIKE ACTIVITY (HCC): Primary | ICD-10-CM

## 2025-01-24 PROCEDURE — 99214 OFFICE O/P EST MOD 30 MIN: CPT | Performed by: PHYSICIAN ASSISTANT

## 2025-01-24 RX ORDER — CITALOPRAM HYDROBROMIDE 10 MG/1
1 TABLET ORAL DAILY
COMMUNITY
Start: 2025-01-06

## 2025-01-24 RX ORDER — FLUTICASONE PROPIONATE AND SALMETEROL XINAFOATE 45; 21 UG/1; UG/1
2 AEROSOL, METERED RESPIRATORY (INHALATION) 2 TIMES DAILY
COMMUNITY

## 2025-01-24 RX ORDER — ASPIRIN 81 MG/1
81 TABLET ORAL DAILY
COMMUNITY

## 2025-01-24 RX ORDER — RIZATRIPTAN BENZOATE 5 MG/1
TABLET ORAL
COMMUNITY
Start: 2025-01-13

## 2025-01-24 RX ORDER — LEVETIRACETAM 1000 MG/1
1000 TABLET ORAL 2 TIMES DAILY
Qty: 180 TABLET | Refills: 1 | Status: SHIPPED | OUTPATIENT
Start: 2025-01-24

## 2025-01-24 RX ORDER — METOCLOPRAMIDE 5 MG/1
1 TABLET ORAL 2 TIMES DAILY
COMMUNITY
Start: 2024-12-14

## 2025-01-24 RX ORDER — PANCRELIPASE 60000; 12000; 38000 [USP'U]/1; [USP'U]/1; [USP'U]/1
CAPSULE, DELAYED RELEASE PELLETS ORAL
COMMUNITY
Start: 2025-01-07

## 2025-01-24 RX ORDER — LORAZEPAM 0.5 MG/1
TABLET ORAL
COMMUNITY
Start: 2025-01-21

## 2025-01-24 NOTE — ASSESSMENT & PLAN NOTE
64-year-old female with history of presumed seizure disorder given recurrent events of seizure-like activity and abnormal movements.  Events started in 2009.  She has had events of loss of consciousness in the past.  More recent events have involved myoclonic jerking/twitching.  She describes hypnic jerks, which can be quite benign.  Unclear if these are related to epileptic seizures.  She has had completely normal workup in the past with MRIs and EEGs, so it is unclear if these events are epileptic versus nonepileptic.  2 years ago when she was last seen in the office it was recommended that she have admission to the epilepsy monitoring unit to further characterize these events.  She had been transitioning from Depakene to levetiracetam at that time.  She was admitted to the hospital acutely for increased seizure-like activity and had 48-hour video EEG monitoring which was normal and did not capture any events.  EMU was still advised but she did not follow through.    She reports she was not having any of the abnormal movements when she was taking levetiracetam consistently, but stopped in July 2024 and was only taking 1 pill of Keppra when she would feel the jerking/twitching come on.  In the last few weeks she has had an increase in the jerking and twitching events, but again, these seem to be more like hypnic jerks, as they are only occurring when she is falling asleep.    We discussed that we are still unsure of her definitive diagnosis of epileptic versus nonepileptic seizures.  The fact that she was not having any of these events when she was taking levetiracetam consistently may point towards epileptic seizures, however it is still not clear.  We discussed returning to levetiracetam 1000 mg twice daily consistently to see if the events subside.  If they do not subside with taking the medication consistently, then I would certainly suggest admission to the epilepsy monitoring unit to definitively characterize  these events.  Will obtain routine EEG, as that would be needed prior to EMU admission.    She mentions she has had ongoing issues with anxiety/depression, as well as other chronic medical issues including chronic pancreatitis.  I reinforced the importance of following with her PCP for these issues.  She may need a referral to psychiatry/psychology as she says her PCP has been hesitant to change her psychiatric medications. Will defer to PCP.     Plan:  - Restart levetiracetam 1000 mg twice daily  - Routine EEG  - If events continue despite regular use of levetiracetam, then would suggest admission to epilepsy monitoring unit for definitive event characterization  - Patient has appointment scheduled on 6//2025 with Dr. Capellan she can keep this appointment for follow-up    Orders:    levETIRAcetam (Keppra) 1000 MG tablet; Take 1 tablet (1,000 mg total) by mouth 2 (two) times a day    EEG Routine and awake; Future

## 2025-01-24 NOTE — PATIENT INSTRUCTIONS
Re-start levetiracetam 1000mg twice a day and take consistently  Routine EEG  Let us know if the jerking/twitching does not stop with the levetiracetam.  If not, then I would suggest admission to the epilepsy monitoring unit in order to capture the events while on EEG so we can definitively characterize them  Follow up with Dr. Capellan in June as scheduled
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
## 2025-02-03 ENCOUNTER — TELEPHONE (OUTPATIENT)
Dept: NEUROLOGY | Facility: CLINIC | Age: 65
End: 2025-02-03

## 2025-02-03 NOTE — TELEPHONE ENCOUNTER
Team    This patient was just seen by Meera on 1/23/2025.  An EEG was ordered which is not scheduled till 2/27/2025.  Please call the patient and  r/s the  appointment with me on Wednesday.  I would recommend that she follow-up after the EEG.    It looks like the follow-up appointment on 6/ 4 had been canceled.  She needs a follow-up in several months after the study.       She does require an OVL since personally she had not been seen by me for  3 years.  Please schedule her in the  June  or July

## 2025-02-05 ENCOUNTER — TELEPHONE (OUTPATIENT)
Dept: NEUROLOGY | Facility: CLINIC | Age: 65
End: 2025-02-05

## 2025-02-05 NOTE — TELEPHONE ENCOUNTER
Called the phone in the chart. No answer left the detailed message that I spoke to her regarding about her appt. Today that doesn't needs to come in patient was added through my chart. Dr Capellan send me the message that she saw angel avalos in [place] office last month. Patient needs to be seen By Dr. Capellan . I already made an appt for the future. 4/30/25 @11:00am. ABRAHAM.

## 2025-02-10 ENCOUNTER — TELEPHONE (OUTPATIENT)
Age: 65
End: 2025-02-10

## 2025-02-10 NOTE — TELEPHONE ENCOUNTER
Patient has been added to the Medication Management and Talk Therapy wait list without a referral.    Insurance: [employer]  Insurance Type:    Commercial []   Medicaid []   [facility] (if applicable)   Medicare [x]  Location Preference: Any  Provider Preference: Any  Virtual: Yes [x] No []  Were outside resources sent: Yes [x] No []    *Please use the following number to call for [phone]

## 2025-02-26 ENCOUNTER — TELEPHONE (OUTPATIENT)
Age: 65
End: 2025-02-26

## 2025-02-26 NOTE — TELEPHONE ENCOUNTER
A routine EEG is needed before EMU admission, so yes, she should proceed with the EEG tomorrow. Maybe an event will be captured on the routine EEG, but if not, we can discuss EMU once she completes the routine

## 2025-02-26 NOTE — TELEPHONE ENCOUNTER
Pt called. Stated that she just read the AVS from 1/24/25. Per the AVS:    Re-start levetiracetam 1000mg twice a day and take consistently  Routine EEG  Let us know if the jerking/twitching does not stop with the levetiracetam.  If not, then I would suggest admission to the epilepsy monitoring unit in order to capture the events while on EEG so we can definitively characterize them        Pt reports that she restarted Keppra 1000 mg BID and has not missed any doses. She still has the jerking and twitching. It used to be that she would only have the jerking and twitching right before she falls asleep, but now it occurs any time of the day. Sometimes she is sitting wide awake and has a jerking motion. Thinks that the episodes may have lessened a little, but now she gets them during the day. Pt is scheduled for the EEG tomorrow. Pt requested to ask if she should proceed with the EEG or wait and be admitted to the epilepsy monitoring unit.    Pt is requesting a return call at #[phone]. This is her 's phone number. It is okay to leave a detailed message on voice mail.

## 2025-02-27 ENCOUNTER — HOSPITAL ENCOUNTER (OUTPATIENT)
Dept: NEUROLOGY | Facility: CLINIC | Age: 65
End: 2025-02-27
Payer: COMMERCIAL

## 2025-02-27 DIAGNOSIS — R56.9 SEIZURE-LIKE ACTIVITY (HCC): ICD-10-CM

## 2025-02-27 DIAGNOSIS — G25.3 MYOCLONIC JERKING: ICD-10-CM

## 2025-02-27 PROCEDURE — 95816 EEG AWAKE AND DROWSY: CPT

## 2025-02-27 PROCEDURE — 95816 EEG AWAKE AND DROWSY: CPT | Performed by: STUDENT IN AN ORGANIZED HEALTH CARE EDUCATION/TRAINING PROGRAM

## 2025-03-03 ENCOUNTER — RESULTS FOLLOW-UP (OUTPATIENT)
Dept: NEUROLOGY | Facility: CLINIC | Age: 65
End: 2025-03-03

## 2025-03-03 DIAGNOSIS — R56.9 SEIZURE-LIKE ACTIVITY (HCC): Primary | ICD-10-CM

## 2025-03-03 DIAGNOSIS — G25.3 MYOCLONIC JERKING: ICD-10-CM

## 2025-03-07 NOTE — TELEPHONE ENCOUNTER
----- Message from Meera Suarez PA-C sent at 3/3/2025  2:23 PM EST -----  EEG with no epileptiform discharges.  Did she have any of the muscle twitching/jerking while on EEG?  I had suggested EMU admission if no improvement with restarting levetiracetam (also see 2/26/25 encounter)

## 2025-03-07 NOTE — TELEPHONE ENCOUNTER
Advised pt of results and recommendations below. She verbalized understanding.     Pt did not have any muscle twitching/jerking during EEG.     Pt states she has not had any muscle twitching/jerking since she called the office to report it (2/28/25).     [phone] - okay to leave detailed msg.    Meera vidal

## 2025-03-10 NOTE — TELEPHONE ENCOUNTER
Pt called. She stated that since 3/7/2025, she has had several episodes of the muscle twitching and jerking while sleeping. Stated that the last 2 nights, she has been awake every 2 hours because of the kicking and thrashing in her sleep. States that her arms lash out and her feet kick. Her  told her that these episodes last about 3 minutes, they go away and then come back again. Her  attempted to get video of her doing this, but by the time he got his camera ready, the thrashing stopped. Pt stated that she has a little table with items on it next to her recliner and she thrashes around so much she knocks the items off the table. Takes Keppra 1000 mg BID and denies missing any doses.   CB# [phone]. Okay to leave a detailed message on voice mail.

## 2025-03-10 NOTE — TELEPHONE ENCOUNTER
We had discussed EMU admission if she was still having symptoms.  Her routine EEG did not show any epileptiform discharges.  If she is agreeable, I can place referral to the EMU in order to capture these events to determine if they are seizures or not.

## 2025-03-11 ENCOUNTER — TELEPHONE (OUTPATIENT)
Dept: NEUROLOGY | Facility: CLINIC | Age: 65
End: 2025-03-11

## 2025-03-11 ENCOUNTER — HOSPITAL ENCOUNTER (EMERGENCY)
Facility: HOSPITAL | Age: 65
Discharge: HOME | End: 2025-03-11
Attending: EMERGENCY MEDICINE
Payer: COMMERCIAL

## 2025-03-11 VITALS
OXYGEN SATURATION: 98 % | DIASTOLIC BLOOD PRESSURE: 78 MMHG | SYSTOLIC BLOOD PRESSURE: 169 MMHG | TEMPERATURE: 97.5 F | HEART RATE: 103 BPM | RESPIRATION RATE: 18 BRPM

## 2025-03-11 DIAGNOSIS — R56.9 SEIZURE-LIKE ACTIVITY (HCC): ICD-10-CM

## 2025-03-11 DIAGNOSIS — R73.9 HYPERGLYCEMIA: ICD-10-CM

## 2025-03-11 DIAGNOSIS — R56.9 SEIZURE-LIKE ACTIVITY (HCC): Primary | ICD-10-CM

## 2025-03-11 DIAGNOSIS — R25.1 TREMOR: Primary | ICD-10-CM

## 2025-03-11 DIAGNOSIS — G25.3 MYOCLONIC JERKING: ICD-10-CM

## 2025-03-11 LAB
ANION GAP SERPL CALCULATED.3IONS-SCNC: 10 MMOL/L (ref 4–13)
BUN SERPL-MCNC: 27 MG/DL (ref 5–25)
CALCIUM SERPL-MCNC: 9.1 MG/DL (ref 8.4–10.2)
CHLORIDE SERPL-SCNC: 96 MMOL/L (ref 96–108)
CO2 SERPL-SCNC: 24 MMOL/L (ref 21–32)
CREAT SERPL-MCNC: 1.15 MG/DL (ref 0.6–1.3)
GFR SERPL CREATININE-BSD FRML MDRD: 49 ML/MIN/1.73SQ M
GLUCOSE SERPL-MCNC: 229 MG/DL (ref 65–140)
GLUCOSE SERPL-MCNC: 251 MG/DL (ref 65–140)
POTASSIUM SERPL-SCNC: 4.8 MMOL/L (ref 3.5–5.3)
SODIUM SERPL-SCNC: 130 MMOL/L (ref 135–147)

## 2025-03-11 PROCEDURE — 99284 EMERGENCY DEPT VISIT MOD MDM: CPT

## 2025-03-11 PROCEDURE — 99284 EMERGENCY DEPT VISIT MOD MDM: CPT | Performed by: EMERGENCY MEDICINE

## 2025-03-11 PROCEDURE — 80048 BASIC METABOLIC PNL TOTAL CA: CPT

## 2025-03-11 PROCEDURE — 36415 COLL VENOUS BLD VENIPUNCTURE: CPT

## 2025-03-11 PROCEDURE — 82948 REAGENT STRIP/BLOOD GLUCOSE: CPT

## 2025-03-11 RX ORDER — LEVETIRACETAM 1000 MG/1
1500 TABLET ORAL 2 TIMES DAILY
Qty: 90 TABLET | Refills: 0 | Status: SHIPPED | OUTPATIENT
Start: 2025-03-11 | End: 2025-04-10

## 2025-03-11 NOTE — TELEPHONE ENCOUNTER
Meera, can you assist with EMU admission?  This is Dr. Capellan's patient who I saw once recently.  Thanks!

## 2025-03-12 NOTE — TELEPHONE ENCOUNTER
She can go back to 1000mg BID.  Looks like she went to ED for viral illness and told them she was having some increased myoclonic jerking and they increased her levetiracetam to 1500mg BID.  Could have been exacerbated by viral illness, and we are not even sure that these are seizures.  Given just myoclonic jerking with no progression to LOC or convulsive seizure, and could be provoked by viral illness, let's just have her go back to prior dosing 1000mg BID and work on getting her into the EMU.  Thanks

## 2025-03-12 NOTE — ED ATTENDING ATTESTATION
3/11/2025  I, Coleman Cartagena DO, saw and evaluated the patient. I have discussed the patient with the resident/non-physician practitioner and agree with the resident's/non-physician practitioner's findings, Plan of Care, and MDM as documented in the resident's/non-physician practitioner's note, except where noted. All available labs and Radiology studies were reviewed.  I was present for key portions of any procedure(s) performed by the resident/non-physician practitioner and I was immediately available to provide assistance.       At this point I agree with the current assessment done in the Emergency Department.  I have conducted an independent evaluation of this patient a history and physical is as follows:    Patient is a 64-year-old female with a history of type 2 diabetes on metformin twice daily, chronic pain, hypertension, paroxysmal atrial flutter, history of myoclonic jerking and seizure-like activity in the past.  She had previously been on Keppra, for seizure-like activity, per review of neurology records, it was unclear if these episodes were either epileptic or nonepileptic and it was recommended after unremarkable 48-hour video EEG monitoring that she have EMU admission but she never followed up.  She self discontinued Keppra in July 2024, had the recurrence of some occasional myoclonic jerking, having predominantly at sleep.  She was seen back by neurology January 24, 2025 who restarted her Keppra, order routine EEG, patient had a outpatient EEG February 27 which showed nonspecific encephalopathy but no other abnormalities.  Patient presents today because for the last week and a half she has had some mild cough, slight congestion, slight runny nose, noticed that her blood sugar which she checks 2 or 3 times a day has been elevated at times sometimes in the 200s or 300s.  She does not take any short acting insulin or half a sliding scale, but just on metformin twice daily.  She also says that over  the last several days she has noticed these occasional myoclonic jerks which normally happen just when she is going to sleep, have been occasionally happening when awake.  She denies any headache, denies any difficulty speaking, thinking, or concentrating, says she just feels little more tired recently.  She says that she has an upcoming 7-day elective EMU appointment hospitalization planned through neurology, says that she was called today and told she would be called tomorrow with the details of when that will occur.  EMS indicates patient remained stable during transport, no seizure activity noted by them.    General:  Patient is well-appearing  Head:  Atraumatic  Eyes:  Conjunctiva pink, Extraocular muscle intact, PERRL  ENT:  Mucous membranes are moist  Neck:  Supple  Cardiac:  S1-S2, without murmurs  Lungs:  Clear to auscultation bilaterally  Abdomen:  Soft, nontender, normal bowel sounds, no CVA tenderness, no tympany, no rigidity, no guarding  Extremities:  Normal range of motion  Neurologic:  Awake, fluent speech, normal comprehension. AAOx3. Cranial nerves 2-12 are intact, strength is 5/5 in the bilateral upper & lower extremities, no slurred speech, no facial droop, no deficit on finger-to-nose testing, no pronator drift.  Sensation to light touch is equal and symmetric throughout the whole body  Skin:  Pink warm and dry, no rash  Psychiatric:  Alert, pleasant, cooperative          ED Course     Patient presents with occasional exacerbation of myoclonic jerking.  She had a recent unremarkable EEG.  She has a normal neuroexam currently, do not believe this is an acute central neurologic event such as a acute stroke.  She does have some mild hyperglycemia per Accu-Chek, doubt this is DKA, most likely increased blood sugar due to a viral illness.  Her lungs are clear, do not believe this is pneumonia.  Case discussed with neurology who recommended checking a metabolic panel, if metabolic panel shows normal  renal function, they would increase her Keppra from 1000 mg p.o. twice daily to 1500 mg p.o. twice daily and have her follow-up as an outpatient with the scheduled EMU appointment.  Checking a metabolic panel will also ensure that she does not have significant other metabolic abnormalities such as severe hyponatremia or severe hyperglycemia or diabetic ketoacidosis. Signed out to Dr. Church      MEDICAL DECISION MAKING CODING    COLLECTION AND INTERPRETATION OF DATA  I reviewed prior external notes, including outpatient neurology office visit, outpatient EEG as noted above    I ordered each unique test  Tests reviewed personally by me:  Labs: Ordered and pending            Critical Care Time  Procedures

## 2025-03-12 NOTE — TELEPHONE ENCOUNTER
"Inbound call received from pt stating that she's playing phone tag with Meera Suarez.    Chart reviewed, it appears it was nurse Meera LEDESMA that called and left a message. Pt states that she thought it was Meera SANTOYO that called her.    Pt says she took her the Keppra and fell asleep. Says  also fell asleep and missed the call.    Pt says Keppra seems to be making her more tired. Says when she came back from the hospital, she had some cereal, set aside the tray, then when she stood up, she whacked her elbow and then whacked the back of her head.    She states that Dr. Capellan is aware of how pt can't feel her feet half the time, she has bad neuropathy. There are times she \"literally cannot feel when stepping down\".     Advised her of Meera's response and recommendation. Pt expresses frustration with back and forth of dose changes. Pt is agreeable to go back to 1000 mg BID.    What pt mostly called about is getting admitted to EMU. Pt is requesting a call back from Meera LEDESMA.    Pt would like to know if they are gonna admit her and when and what she can bring.    Meera LEDESMA - Please call pt at [phone]. This is her 's number.   "

## 2025-03-12 NOTE — DISCHARGE INSTRUCTIONS
You have been evaluated in the emergency department for tremors.  Your blood sugar is high today.  You are safe for discharged home.  Please start taking 1500 mg of Keppra every day.  A new prescription of is done to your pharmacy for you to .  Please continue to follow-up with neurology to discuss scheduling your EMU.    Please follow-up with your family doctor tomorrow to discuss your high blood sugar today.    Please return if you develop any new or concerning symptoms including change in characteristics of your seizure, high fevers, or severe vomiting.

## 2025-03-12 NOTE — TELEPHONE ENCOUNTER
Pt returning missed call. Pt also mentioned since upping keppra dose to 1500 mg she has felt a little different. She reported that she fell over and hit her elbow. She confirmed it is not something she had to go to the hospital for but wanted the provider to know.

## 2025-03-12 NOTE — TELEPHONE ENCOUNTER
Call returned to patient. No answer. Again left message for return call to office.    Meera - while awaiting patient return call to discuss symptoms, looks like keppra was increased by ED on 3/11/2025 to 1500mg BID. Would you have any other recommendations regarding med adjustments?

## 2025-03-12 NOTE — ED PROVIDER NOTES
Time reflects when diagnosis was documented in both MDM as applicable and the Disposition within this note       Time User Action Codes Description Comment    3/11/2025 11:11 PM Krivchenia, Jose A Add [R25.1] Tremors of nervous system     3/11/2025 11:11 PM Krivchenia, Jose A Remove [R25.1] Tremors of nervous system     3/11/2025 11:11 PM Krivchenia, Jose A Add [R25.1] Tremor     3/11/2025 11:11 PM Krivchenia, Jose A Add [R73.9] Hyperglycemia     3/11/2025 11:11 PM Krivchenia, Jose A Add [R56.9] Seizure-like activity (HCC)           ED Disposition       ED Disposition   Discharge    Condition   Stable    Date/Time   Tue Mar 11, 2025 11:11 PM    Comment   Kathie Cochran discharge to home/self care.                   Assessment & Plan       Medical Decision Making  64-year-old female with history of epilepsy and diabetes presenting today for evaluation of 2 days of worsening shaking episodes.  Patient states that she has a longstanding history of epilepsy and has been on long-term Keppra for this.  Patient states that a couple months ago she had taken herself off Keppra and started develop worsening tremors.  She called her neurologist who represcribed her Keppra which she has been compliant with since that time.  She says that despite this she has been having intermittent episodes of jerking and tremors during sleep.  Patient had outpatient EEG done recently which showed no epileptiform activity.  She reports that over the past several days she is now developing some jerking and startling when she is awake as well.  Patient's  at bedside shows me a video of the patient in her bed who seems to be trying to fall asleep and then startles back awake.  Patient called the neurology office and is currently being set up for an EMU but she was concerned about her symptoms so she came in for an evaluation today.  Patient states she has been compliant with her Keppra at home.  She also notes that recently she has been having  about a month of viral URI symptoms including a dry cough and rhinorrhea.  She notes some recent fatigue as well and is concerned that her blood sugar has been high and uncontrolled on her oral metformin.  Prior to arrival, she reports that her blood sugar by EMS was in the 300s.    Differential: Tremors, seizure-like activity, hyperglycemia.    Plan: Point-of-care glucose is downtrending from reported EMS value, doubt DKA at this time.  Recorded videos that patient's  show me makes me less suspicious for breakthrough seizures, will plan to reach out to the neurology team to discuss if they would prefer changes to her antiepileptic regimen prior to obtaining her EMU.    After discussing the case with the neurology resident, will plan to obtain BMP to evaluate renal function, if normal will plan to increase patient's Keppra dose to 1500 mg twice daily.  Patient instructed to follow-up with neurology to discuss planning for EMU.  Prescription for increased dose of Keppra sent to patient's pharmacy.  Return precautions given, all questions answered.    Amount and/or Complexity of Data Reviewed  Labs: ordered.    Risk  Prescription drug management.        ED Course as of 03/12/25 1623   Tue Mar 11, 2025   2223 Will plan to obtain renal function per neurology recommendations to determine if patient can be on an increased dose of Keppra.       Medications - No data to display    ED Risk Strat Scores                            SBIRT 22yo+      Flowsheet Row Most Recent Value   Initial Alcohol Screen: US AUDIT-C     1. How often do you have a drink containing alcohol? 0 Filed at: 03/11/2025 2102   Audit-C Score 0 Filed at: 03/11/2025 2102                            History of Present Illness       Chief Complaint   Patient presents with    Tremors     Pt has PMH of chronic tremors and a seizure disorder. Has been having increased tremors recently and would like to be evaluated. Pt also reports having a cold for the  past week and feeling more lethargic. Pt has a 7 day study coming up with neurology coming up.        Past Medical History:   Diagnosis Date    Asthma     Atrial flutter (HCC)     Bipolar disorder (HCC)     CRPS (complex regional pain syndrome type I)     Depression     Hepatitis C     Hypertension     Psychiatric disorder     Skin disease       Past Surgical History:   Procedure Laterality Date    BILATERAL TEMPOROMANDIBULAR JOINT ARTHROPLASTY  1987    DENTAL SURGERY  2004    NECK SURGERY  1997      Family History   Problem Relation Age of Onset    No Known Problems Mother     No Known Problems Father     Cancer Daughter       Social History     Tobacco Use    Smoking status: Former    Smokeless tobacco: Never   Vaping Use    Vaping status: Never Used   Substance Use Topics    Alcohol use: Not Currently    Drug use: Not Currently      E-Cigarette/Vaping    E-Cigarette Use Never User       E-Cigarette/Vaping Substances    Nicotine No     THC No     CBD No     Flavoring No     Other No     Unknown No       I have reviewed and agree with the history as documented.     Patient is a 64-year-old female with history of seizures and diabetes presenting today for evaluation of tremors.  Patient says that she has a history of chronic tremors and a seizure disorder.  Patient states that she has been on long-term Keppra to control her seizures.  Patient states that for the past couple of days the tremors have been getting worse.  They occur mostly at night when she is asleep but also are now occurring sometimes during the daytime when she is awake.  She states she is aware that they are happening.  Patient recently had an outpatient EEG done which showed no epileptiform activity.  Patient is currently working on scheduling an outpatient EMU.  Patient says that she became concerned about these daytime shaking episodes and came in today to be evaluated.  Patient also reports about a 1 month history of dry cough, rhinorrhea, and  fatigue.  She states that she thinks she has a viral illness.  She does note that she is on metformin and things that her blood sugars have been higher than usual lately.  She has been compliant with her metformin and Keppra at home.        Review of Systems   Constitutional:  Positive for fatigue. Negative for chills and fever.   HENT:  Positive for rhinorrhea. Negative for congestion and sore throat.    Eyes:  Negative for pain and visual disturbance.   Respiratory:  Positive for cough. Negative for shortness of breath.    Cardiovascular:  Negative for chest pain and palpitations.   Gastrointestinal:  Negative for abdominal pain, constipation, diarrhea, nausea and vomiting.   Genitourinary:  Negative for dysuria and hematuria.   Musculoskeletal:  Negative for back pain and neck pain.   Skin:  Negative for color change and rash.   Neurological:  Negative for weakness and numbness.   All other systems reviewed and are negative.          Objective       ED Triage Vitals [03/11/25 2059]   Temperature Pulse Blood Pressure Respirations SpO2 Patient Position - Orthostatic VS   97.5 °F (36.4 °C) 103 169/78 18 98 % Sitting      Temp Source Heart Rate Source BP Location FiO2 (%) Pain Score    Tympanic Monitor Left arm -- --      Vitals      Date and Time Temp Pulse SpO2 Resp BP Pain Score FACES Pain Rating User   03/11/25 2059 97.5 °F (36.4 °C) 103 98 % 18 169/78 -- -- MS            Physical Exam  Vitals and nursing note reviewed.   Constitutional:       General: She is not in acute distress.     Appearance: Normal appearance. She is well-developed. She is obese. She is not ill-appearing, toxic-appearing or diaphoretic.   HENT:      Head: Normocephalic and atraumatic.      Right Ear: External ear normal.      Left Ear: External ear normal.      Nose: Nose normal. No congestion or rhinorrhea.      Mouth/Throat:      Mouth: Mucous membranes are moist.   Eyes:      General: No scleral icterus.        Right eye: No discharge.          Left eye: No discharge.      Conjunctiva/sclera: Conjunctivae normal.   Cardiovascular:      Rate and Rhythm: Normal rate and regular rhythm.      Pulses: Normal pulses.      Heart sounds: Normal heart sounds. No murmur heard.     No friction rub. No gallop.   Pulmonary:      Effort: Pulmonary effort is normal. No respiratory distress.      Breath sounds: Normal breath sounds. No stridor. No wheezing, rhonchi or rales.   Abdominal:      General: Abdomen is flat.      Palpations: Abdomen is soft.      Tenderness: There is no abdominal tenderness.   Musculoskeletal:         General: Normal range of motion.      Cervical back: Normal range of motion and neck supple.   Skin:     General: Skin is warm and dry.      Capillary Refill: Capillary refill takes less than 2 seconds.      Coloration: Skin is not jaundiced or pale.   Neurological:      General: No focal deficit present.      Mental Status: She is alert and oriented to person, place, and time.      Cranial Nerves: No cranial nerve deficit.      Sensory: No sensory deficit.      Motor: No weakness.      Coordination: Coordination normal.      Gait: Gait normal.   Psychiatric:         Mood and Affect: Mood normal.         Behavior: Behavior normal.         Results Reviewed       Procedure Component Value Units Date/Time    Basic metabolic panel [[id number]]  (Abnormal) Collected: 03/11/25 2231    Lab Status: Final result Specimen: Blood from Arm, Left Updated: 03/11/25 2304     Sodium 130 mmol/L      Potassium 4.8 mmol/L      Chloride 96 mmol/L      CO2 24 mmol/L      ANION GAP 10 mmol/L      BUN 27 mg/dL      Creatinine 1.15 mg/dL      Glucose 251 mg/dL      Calcium 9.1 mg/dL      eGFR 49 ml/min/1.73sq m     Narrative:      National Kid[employer] Foundation guidelines for Chronic Kidney Disease (CKD):     Stage 1 with normal or high GFR (GFR > 90 mL/min/1.73 square meters)    Stage 2 Mild CKD (GFR = 60-89 mL/min/1.73 square meters)    Stage 3A Moderate CKD  (GFR = 45-59 mL/min/1.73 square meters)    Stage 3B Moderate CKD (GFR = 30-44 mL/min/1.73 square meters)    Stage 4 Severe CKD (GFR = 15-29 mL/min/1.73 square meters)    Stage 5 End Stage CKD (GFR <15 mL/min/1.73 square meters)  Note: GFR calculation is accurate only with a steady state creatinine    Fingerstick Glucose (POCT) [[id number]]  (Abnormal) Collected: 25    Lab Status: Final result Specimen: Blood Updated: 25     POC Glucose 229 mg/dl             No orders to display       Procedures    ED Medication and Procedure Management   Prior to Admission Medications   Prescriptions Last Dose Informant Patient Reported? Taking?   Cholecalciferol (Vitamin D3) 25 MCG (1000 UT) CAPS  Self Yes No   Sig: Take 1 capsule by mouth daily   Creon [id number] units capsule   Yes No   Sig: TAKE 1 CAPSULE BY MOUTH TWO TIMES DAILY WITH MEALS   Diclofenac Sodium 1.5 % SOLN   No No   Sig: Apply 80 mg topically 2 (two) times a day as needed (as needed)   Eliquis 5 MG   Yes No   Sig: Take 5 mg by mouth 2 (two) times a day   LORazepam (ATIVAN) 0.5 mg tablet   Yes No   OLANZapine (ZyPREXA) 2.5 mg tablet   Yes No   Sig: Take 2.5 mg by mouth every evening   albuterol (PROVENTIL HFA,VENTOLIN HFA) 90 mcg/act inhaler   Yes No   Sig: Inhale 2 puffs 2 (two) times a day as needed   amitriptyline (ELAVIL) 50 mg tablet   Yes No   Sig: Take 50 mg by mouth every evening   aspirin (ECOTRIN LOW STRENGTH) 81 mg EC tablet   Yes No   Sig: Take 81 mg by mouth daily   atorvastatin (LIPITOR) 10 mg tablet   Yes No   Sig: Take 10 mg by mouth daily   citalopram (CeleXA) 10 mg tablet   Yes No   Sig: Take 1 tablet by mouth in the morning   ergocalciferol (VITAMIN D2) 50,000 units   No No   Sig: TAKE 1 CAPSULE BY MOUTH ONCE WEEKLY   fluticasone (FLONASE) 50 mcg/act nasal spray   No No   Si spray into each nostril daily for 10 days Do not start before 2023.   fluticasone-salmeterol (ADVAIR HFA) 45-21 MCG/ACT inhaler   Yes No    Sig: Inhale 2 puffs 2 (two) times a day   levETIRAcetam (Keppra) 1000 MG tablet   No No   Sig: Take 1 tablet (1,000 mg total) by mouth 2 (two) times a day   levETIRAcetam (Keppra) 1000 MG tablet   No Yes   Sig: Take 1.5 tablets (1,500 mg total) by mouth 2 (two) times a day   loratadine (CLARITIN) 10 mg tablet   No No   Sig: Take 1 tablet (10 mg total) by mouth daily Do not start before February 2, 2023.   menthol-zinc oxide (Calmoseptine) 0.44-20.6 % OINT  Self Yes No   Sig: Apply topically 2 (two) times a day   metFORMIN (GLUCOPHAGE) 500 mg tablet   Yes No   Sig: Take 1 tablet by mouth 2 (two) times a day with meals   metoclopramide (REGLAN) 5 mg tablet   Yes No   Sig: Take 1 tablet by mouth 2 (two) times a day   metoprolol tartrate (LOPRESSOR) 25 mg tablet   Yes No   Sig: Take 12.5 mg by mouth every 12 (twelve) hours   miconazole (Miconazorb AF) 2 % powder  Self Yes No   Sig: Apply topically as needed for itching   morphine (MSIR) 30 MG tablet   Yes No   Sig: Take 30 mg by mouth every 4 (four) hours as needed   omeprazole (PriLOSEC) 20 mg delayed release capsule   Yes No   Sig: Take 20 mg by mouth daily before breakfast   rizatriptan (MAXALT) 5 mg tablet   Yes No   Sig: TAKE 1 TABLET BY MOUTH FOR ONE DOSE AS NEEDED FOR MIGRAINE. MAY REPEAT IN 2 HOURS IF UNRESOLVED. DO NOT EXCEED 6 TABLETS IN 24 HOURS.   tiZANidine (ZANAFLEX) 4 mg tablet   Yes No      Facility-Administered Medications: None     Discharge Medication List as of 3/11/2025 11:13 PM        CONTINUE these medications which have CHANGED    Details   levETIRAcetam (Keppra) 1000 MG tablet Take 1.5 tablets (1,500 mg total) by mouth 2 (two) times a day, Starting Tue 3/11/2025, Until Thu 4/10/2025, Normal           CONTINUE these medications which have NOT CHANGED    Details   albuterol (PROVENTIL HFA,VENTOLIN HFA) 90 mcg/act inhaler Inhale 2 puffs 2 (two) times a day as needed, Historical Med      amitriptyline (ELAVIL) 50 mg tablet Take 50 mg by mouth  every evening, Starting Thu 9/3/2020, Historical Med      aspirin (ECOTRIN LOW STRENGTH) 81 mg EC tablet Take 81 mg by mouth daily, Historical Med      atorvastatin (LIPITOR) 10 mg tablet Take 10 mg by mouth daily, Starting Wed 1/23/2019, Historical Med      Cholecalciferol (Vitamin D3) 25 MCG (1000 UT) CAPS Take 1 capsule by mouth daily, Historical Med      citalopram (CeleXA) 10 mg tablet Take 1 tablet by mouth in the morning, Starting Mon 1/6/2025, Historical Med      Creon [id number] units capsule TAKE 1 CAPSULE BY MOUTH TWO TIMES DAILY WITH MEALS, Historical Med      Diclofenac Sodium 1.5 % SOLN Apply 80 mg topically 2 (two) times a day as needed (as needed), Starting Fri 3/11/2022, Normal      Eliquis 5 MG Take 5 mg by mouth 2 (two) times a day, Starting Wed 1/4/2023, Historical Med      ergocalciferol (VITAMIN D2) 50,000 units TAKE 1 CAPSULE BY MOUTH ONCE WEEKLY, Normal      fluticasone (FLONASE) 50 mcg/act nasal spray 1 spray into each nostril daily for 10 days Do not start before February 2, 2023., Starting u 2/2/2023, Until Sun 2/12/2023, Normal      fluticasone-salmeterol (ADVAIR HFA) 45-21 MCG/ACT inhaler Inhale 2 puffs 2 (two) times a day, Historical Med      loratadine (CLARITIN) 10 mg tablet Take 1 tablet (10 mg total) by mouth daily Do not start before February 2, 2023., Starting Thu 2/2/2023, Normal      LORazepam (ATIVAN) 0.5 mg tablet Historical Med      menthol-zinc oxide (Calmoseptine) 0.44-20.6 % OINT Apply topically 2 (two) times a day, Historical Med      metFORMIN (GLUCOPHAGE) 500 mg tablet Take 1 tablet by mouth 2 (two) times a day with meals, Starting Mon 10/7/2024, Historical Med      metoclopramide (REGLAN) 5 mg tablet Take 1 tablet by mouth 2 (two) times a day, Starting Sat 12/14/2024, Historical Med      metoprolol tartrate (LOPRESSOR) 25 mg tablet Take 12.5 mg by mouth every 12 (twelve) hours, Historical Med      miconazole (Miconazorb AF) 2 % powder Apply topically as needed for  itching, Historical Med      morphine (MSIR) 30 MG tablet Take 30 mg by mouth every 4 (four) hours as needed, Historical Med      OLANZapine (ZyPREXA) 2.5 mg tablet Take 2.5 mg by mouth every evening, Starting Fri 9/4/2020, Historical Med      omeprazole (PriLOSEC) 20 mg delayed release capsule Take 20 mg by mouth daily before breakfast, Starting Tue 11/3/2020, Historical Med      rizatriptan (MAXALT) 5 mg tablet TAKE 1 TABLET BY MOUTH FOR ONE DOSE AS NEEDED FOR MIGRAINE. MAY REPEAT IN 2 HOURS IF UNRESOLVED. DO NOT EXCEED 6 TABLETS IN 24 HOURS., Historical Med      tiZANidine (ZANAFLEX) 4 mg tablet Starting Fri 2/22/2019, Historical Med           No discharge procedures on file.  ED SEPSIS DOCUMENTATION   Time reflects when diagnosis was documented in both MDM as applicable and the Disposition within this note       Time User Action Codes Description Comment    3/11/2025 11:11 PM Jose A Fragoso Add [R25.1] Tremors of nervous system     3/11/2025 11:11 PM Jose A Fragoso Remove [R25.1] Tremors of nervous system     3/11/2025 11:11 PM Jose A Fragoso Add [R25.1] Tremor     3/11/2025 11:11 PM Jose A Fragoso Add [R73.9] Hyperglycemia     3/11/2025 11:11 PM Jose A Fragoso Add [R56.9] Seizure-like activity (HCC)                  Jose A Fragoso MD  03/12/25 3690

## 2025-03-13 NOTE — TELEPHONE ENCOUNTER
EMU scheduled for 5/13/2025. Added to calendar. ADT21 entered. EMU education sent to patient via [employer]. Notified precert via Teams and added to spreadsheet.     Will follow up with patient next week for any other questions.

## 2025-03-13 NOTE — TELEPHONE ENCOUNTER
Pt called back and confirmed that date works for her. Appreciative of all the help to set up EMU admission.     Pt does have more general questions and would like a call back regarding the admissions. Pt asked for call back sometime next week as this week is pretty busy for her.     Please assist,thank you.

## 2025-03-13 NOTE — TELEPHONE ENCOUNTER
Call placed to patient [phone]. No answer. Message left for patient offering EMU admission 5/13/2025 9am arrival time to B. Requested call back to further discuss.

## 2025-03-20 NOTE — TELEPHONE ENCOUNTER
Recd call from patient with regards to upcoming EMU. Pt reported that she needs to reschedule her EMU admission due to her spouse getting Rotator Cuff Surgery.     Pt also expressed concern stating that her Keppra Rx has caused her to gain weight. Reported that she weighed 171 lbs over a month ago and weighed herself today (without any apparel on) and weighs 185.4 lbs. Asked pt if she felt as if she was retaining fluids. Advised pt to contact PCP as well to discuss concern of weight gain.

## 2025-03-24 NOTE — TELEPHONE ENCOUNTER
Patient sent [employer] message requesting to reschedule EMU admission for 6/9/2025. Adjusted date on EMU calendar. Adjusted date on precert spreadsheet. Notified Kyea in PACS of date change.

## 2025-04-08 DIAGNOSIS — R56.9 SEIZURE-LIKE ACTIVITY (HCC): ICD-10-CM

## 2025-04-08 NOTE — TELEPHONE ENCOUNTER
Patient needs refill on Keppra 1000MG  She reported taking 1 tablet two times a day.  This is different than that on med list.     Please send to [facility] - she has enough left for one day.

## 2025-04-10 RX ORDER — LEVETIRACETAM 1000 MG/1
1000 TABLET ORAL 2 TIMES DAILY
Qty: 60 TABLET | Refills: 3 | Status: SHIPPED | OUTPATIENT
Start: 2025-04-10

## 2025-04-10 NOTE — TELEPHONE ENCOUNTER
Patient's  Gary called back. Patient is out of medication and needs the prescription sent to [facility] [place] for Keppra 1000 mg twice daily, current dose in chart is for 1500 mg twice daily which was changed on 3/12/25 by Adri Suarez PA-C She can go back to 1000mg BID.  Please send a new prescription as  soon as possible.

## 2025-04-10 NOTE — TELEPHONE ENCOUNTER
She should be taking 1000mg BID.  ED had suggested increasing to 1500mg BID, but I advised she go back to 1000mg BID the day after she was in the ED, as I did not feel that was necessary.  Changed Rx for 1000mg BID dosing

## 2025-04-10 NOTE — TELEPHONE ENCOUNTER
Keppra 1,000 mg BID Rx pended. Please advise if pt can continue taking 1,000 BID and not 1,500 BID per ED visit 3/12/25. If agreeable, dosage needs to be changed to BID on Rx pending.

## 2025-04-30 ENCOUNTER — OFFICE VISIT (OUTPATIENT)
Dept: NEUROLOGY | Facility: CLINIC | Age: 65
End: 2025-04-30
Payer: COMMERCIAL

## 2025-04-30 VITALS
HEIGHT: 65 IN | BODY MASS INDEX: 30.16 KG/M2 | RESPIRATION RATE: 14 BRPM | WEIGHT: 181 LBS | TEMPERATURE: 96.7 F | DIASTOLIC BLOOD PRESSURE: 72 MMHG | OXYGEN SATURATION: 98 % | HEART RATE: 128 BPM | SYSTOLIC BLOOD PRESSURE: 132 MMHG

## 2025-04-30 DIAGNOSIS — R56.9 SEIZURE-LIKE ACTIVITY (HCC): ICD-10-CM

## 2025-04-30 DIAGNOSIS — S06.0XAA CONCUSSION: ICD-10-CM

## 2025-04-30 DIAGNOSIS — G62.9 POLYNEUROPATHY: ICD-10-CM

## 2025-04-30 DIAGNOSIS — E53.8 B12 DEFICIENCY: Primary | ICD-10-CM

## 2025-04-30 PROCEDURE — 99214 OFFICE O/P EST MOD 30 MIN: CPT | Performed by: PSYCHIATRY & NEUROLOGY

## 2025-04-30 PROCEDURE — 96372 THER/PROPH/DIAG INJ SC/IM: CPT | Performed by: PSYCHIATRY & NEUROLOGY

## 2025-04-30 RX ORDER — CYANOCOBALAMIN 1000 UG/ML
1000 INJECTION, SOLUTION INTRAMUSCULAR; SUBCUTANEOUS ONCE
Status: COMPLETED | OUTPATIENT
Start: 2025-04-30 | End: 2025-04-30

## 2025-04-30 RX ADMIN — CYANOCOBALAMIN 1000 MCG: 1000 INJECTION, SOLUTION INTRAMUSCULAR; SUBCUTANEOUS at 11:59

## 2025-04-30 NOTE — PROGRESS NOTES
Name: Kathie Cochran      : 1960      MRN: [id number]  Encounter Provider: Amena Capellan DO  Encounter Date: 2025   Encounter department: [facility] [place]  :  Assessment & Plan  B12 deficiency    Orders:    cyanocobalamin injection 1,000 mcg    B12 level was 169.  Given her myriad of symptoms she was treated with 1000 mcg of B12 today she does require them weekly for the next 6 weeks and then monthly for 3 months.  I discussed with her she can receive the B12 injections either at her PCP or through her offices what ever would be closer.  She was to contact our offices accordingly.  Polyneuropathy  She does have some dysesthesias and numbness in her feet she has a known history of diabetes and recent hemoglobin A1c was elevated       Seizure-like activity (HCC)  She continues to have myoclonic jerking episodes.  This improved when Keppra was initiated at 1000 twice a day.  The dose was increased to 1500 twice a day but then has been reduced due to the current dose.  She reports continued symptoms of myoclonic movements in the arms and legs.  A routine EEG was normal and she is scheduled for EMU admission in several weeks.  in the meantime she should continue on Keppra at 1000 twice a day.  She did have severe side effects to gabapentin with change in behavior.  She was on Depakote which had been weaned off several years ago.  We did discuss that myoclonic jerking episodes can be multifactorial including medications, electrolyte abnormalities and or seizures.  Subsequently in the EMU will be helpful for further clarification.         Concussion  She has had 2 falls in the last few months both resulting in headaches and hitting her head abruptly.  She was diagnosed with a concussion by her PCP yesterday.  She does report bifrontal headaches with photophobia.  We did discuss her prior migraine headaches and they would include severe hole cephalic headache with photophobia  and phonophobia.  she reports these type of headaches are not as intense and the photophobia is not as severe.  Overall the headaches have improved slightly.  She does report excessive drowsiness and cognitive issues.  She is not on any preventative medication and has not been utilizing  triptan as it has been used for migraine equivalents.  Her PCP did suggest a concussion clinic.  We discussed that this could include OT PT and potentially medications.  I have encouraged her to schedule.               History of Present Illness 64-year-old female with history of presumed seizure disorder given recurrent events of seizure-like activity and abnormal movements.  Events started in 2009.  She has had events of loss of consciousness in the past.  More recent events have involved myoclonic jerking/twitching.  She describes hypnic jerks, which can be quite benign.  Unclear if these are related to epileptic seizures.  She has had completely normal workup in the past with MRIs and EEGs, so it is unclear if these events are epileptic versus nonepileptic.  2 years ago when she was last seen in the office it was recommended that she have admission to the epilepsy monitoring unit to further characterize these events.  She had been transitioning from Depakene to levetiracetam at that time.  She was admitted to the hospital acutely for increased seizure-like activity and had 48-hour video EEG monitoring which was normal and did not capture any events.  EMU was still advised but she did not follow through.    Stopped utilizing Keppra in July 2024 and was only taking 1 pill when she would develop the symptoms.  After an appointment with Meera the dose was asked to continue on a regular dose.  Subsequently her dose of Keppra was increased to 1500 mg twice a day but then reduce to 1000 twice a day.  An EEG showed no epileptiform discharges and now she is scheduled for EMU in June.    She did have a chronic pancreatitis and was on  various medications that are beginning to wean off.            Recently she fell twice and hit her head.  He had 2 falls in 3 weeks.  Since that last fall on 4 8 she has now had headaches, excessive drowsiness.  The headache occurs in the front of her head and was associated with difficulty with cognition.  The photophobia is not as severe as compared to her migraine equivalent.  CAT scan of the head was normal.  She was seen by her PCP yesterday and concussion clinic appointment was suggested.  She reports her migraine headaches are infrequent.  They are usually occurring only cephalically was severe photophobia and phonophobia.  She has not utilized need for triptan agents.              Today her  Gary did accompany her to the visit.  She reports that shaking and myoclonic episodes occur more in the evening and can occur at night.  They have improved with initiating Keppra but have not completely resolved.  In the past she was on gabapentin resulted in severe side effects and Depakote have been weaned off several years ago.  She was on Lamictal in the past with unclear benefit or side effects.  She does complain of vivid dreams.    She was recently evaluated by her PCP yesterday after being hospitalized.  Laboratory studies were performed which showed a B12 level of 169.  She does admit to poor appetite recently.  Her vitamin D level was also low.  HbA1c was 8.6.  CMP was normal.                       Review of Systems   Constitutional:  Negative for appetite change, fatigue and fever.   HENT: Negative.  Negative for hearing loss, tinnitus, trouble swallowing and voice change.    Eyes: Negative.  Negative for photophobia, pain and visual disturbance.   Respiratory: Negative.  Negative for shortness of breath.    Cardiovascular: Negative.  Negative for palpitations.   Gastrointestinal: Negative.  Negative for nausea and vomiting.   Endocrine: Negative.  Negative for cold intolerance.   Genitourinary:  "Negative.  Negative for dysuria, frequency and urgency.   Musculoskeletal:  Positive for back pain, gait problem, neck pain and neck stiffness. Negative for myalgias.   Skin: Negative.  Negative for rash.   Allergic/Immunologic: Negative.    Neurological:  Positive for seizures (twtiching) and weakness. Negative for dizziness, tremors, syncope, facial asymmetry, speech difficulty, light-headedness, numbness and headaches.   Hematological: Negative.  Does not bruise/bleed easily.   Psychiatric/Behavioral: Negative.  Negative for confusion, hallucinations and sleep disturbance.    All other systems reviewed and are negative.   I have personally reviewed the MA's review of systems and made changes as necessary.    Medical History Reviewed by provider this encounter:  Meds     .     Objective   /72 (BP Location: Right arm, Patient Position: Sitting, Cuff Size: Adult)   Pulse (!) 128   Temp (!) 96.7 °F (35.9 °C) (Temporal)   Resp 14   Ht 5' 5\" (1.651 m)   Wt 82.1 kg (181 lb)   SpO2 98%   BMI 30.12 kg/m²     Physical Exam  Eyes:      General: Lids are normal.      Extraocular Movements: Extraocular movements intact.      Pupils: Pupils are equal, round, and reactive to light.   Neurological:      Motor: Motor strength is normal.     Deep Tendon Reflexes:      Reflex Scores:       Patellar reflexes are 1+ on the right side and 1+ on the left side.      Neurological Exam  Mental Status  Awake, alert and oriented to person, place and time. Oriented to person, place and time. Language is fluent with no aphasia.    Cranial Nerves  CN II: Visual acuity is normal. Visual fields full to confrontation.  CN III, IV, VI: Extraocular movements intact bilaterally. Normal lids and orbits bilaterally. Pupils equal round and reactive to light bilaterally.  CN V: Facial sensation is normal.  CN VII: Full and symmetric facial movement.  CN VIII: Hearing is normal.  CN IX, X: Palate elevates symmetrically. Normal gag " reflex.  CN XI: Shoulder shrug strength is normal.  CN XII: Tongue midline without atrophy or fasciculations.  There was no papilledema..    Motor  Normal muscle bulk throughout. No fasciculations present. Strength is 5/5 throughout all four extremities.  No movements were noted today..    Sensory  Sensation was 12 seconds at the toes proprioception was intact.  Light touch was intact..    Reflexes                                            Right                      Left  Patellar                                1+                         1+  Achilles                                Tr                         Tr  Right Plantar: downgoing  Left Plantar: downgoing    Right pathological reflexes: Sherif's absent.  Left pathological reflexes: Sherif's absent.    Coordination  Right: Finger-to-nose normal.Left: Finger-to-nose normal.    Gait   Unable to rise from chair without using arms.  She presented in a wheelchair..    Return to our offices after the EMU admission.      I have spent a total time of 37 minutes in caring for this patient on the day of the visit/encounter including Risks and benefits of tx options, Patient and family education, Counseling / Coordination of care, Documenting in the medical record, Reviewing/placing orders in the medical record (including tests, medications, and/or procedures), and Obtaining or reviewing history  .

## 2025-04-30 NOTE — ASSESSMENT & PLAN NOTE
She continues to have myoclonic jerking episodes.  This improved when Keppra was initiated at 1000 twice a day.  The dose was increased to 1500 twice a day but then has been reduced due to the current dose.  She reports continued symptoms of myoclonic movements in the arms and legs.  A routine EEG was normal and she is scheduled for EMU admission in several weeks.  in the meantime she should continue on Keppra at 1000 twice a day.  She did have severe side effects to gabapentin with change in behavior.  She was on Depakote which had been weaned off several years ago.  We did discuss that myoclonic jerking episodes can be multifactorial including medications, electrolyte abnormalities and or seizures.  Subsequently in the EMU will be helpful for further clarification.         
She does have some dysesthesias and numbness in her feet she has a known history of diabetes and recent hemoglobin A1c was elevated       
She has had 2 falls in the last few months both resulting in headaches and hitting her head abruptly.  She was diagnosed with a concussion by her PCP yesterday.  She does report bifrontal headaches with photophobia.  We did discuss her prior migraine headaches and they would include severe hole cephalic headache with photophobia and phonophobia.  she reports these type of headaches are not as intense and the photophobia is not as severe.  Overall the headaches have improved slightly.  She does report excessive drowsiness and cognitive issues.  She is not on any preventative medication and has not been utilizing  triptan as it has been used for migraine equivalents.  Her PCP did suggest a concussion clinic.  We discussed that this could include OT PT and potentially medications.  I have encouraged her to schedule.         
No.

## 2025-07-22 DIAGNOSIS — R56.9 SEIZURE-LIKE ACTIVITY (HCC): ICD-10-CM

## 2025-07-23 RX ORDER — LEVETIRACETAM 1000 MG/1
1000 TABLET ORAL 2 TIMES DAILY
Qty: 60 TABLET | Refills: 5 | Status: SHIPPED | OUTPATIENT
Start: 2025-07-23